# Patient Record
Sex: FEMALE | Race: ASIAN | NOT HISPANIC OR LATINO | Employment: FULL TIME | URBAN - METROPOLITAN AREA
[De-identification: names, ages, dates, MRNs, and addresses within clinical notes are randomized per-mention and may not be internally consistent; named-entity substitution may affect disease eponyms.]

---

## 2017-03-29 ENCOUNTER — APPOINTMENT (OUTPATIENT)
Dept: GENERAL RADIOLOGY | Facility: CLINIC | Age: 32
End: 2017-03-29
Attending: EMERGENCY MEDICINE
Payer: COMMERCIAL

## 2017-03-29 ENCOUNTER — HOSPITAL ENCOUNTER (EMERGENCY)
Facility: CLINIC | Age: 32
Discharge: HOME | End: 2017-03-30
Attending: EMERGENCY MEDICINE | Admitting: EMERGENCY MEDICINE
Payer: COMMERCIAL

## 2017-03-29 DIAGNOSIS — R73.9 HYPERGLYCEMIA: ICD-10-CM

## 2017-03-29 DIAGNOSIS — R07.89 ATYPICAL CHEST PAIN: ICD-10-CM

## 2017-03-29 PROCEDURE — 80048 BASIC METABOLIC PNL TOTAL CA: CPT | Performed by: EMERGENCY MEDICINE

## 2017-03-29 PROCEDURE — 25000125 ZZHC RX 250: Performed by: EMERGENCY MEDICINE

## 2017-03-29 PROCEDURE — 93005 ELECTROCARDIOGRAM TRACING: CPT

## 2017-03-29 PROCEDURE — 99285 EMERGENCY DEPT VISIT HI MDM: CPT

## 2017-03-29 PROCEDURE — 25000132 ZZH RX MED GY IP 250 OP 250 PS 637: Performed by: EMERGENCY MEDICINE

## 2017-03-29 PROCEDURE — 84484 ASSAY OF TROPONIN QUANT: CPT

## 2017-03-29 PROCEDURE — 71020 XR CHEST 2 VW: CPT

## 2017-03-29 RX ADMIN — LIDOCAINE HYDROCHLORIDE 30 ML: 20 SOLUTION ORAL; TOPICAL at 23:46

## 2017-03-29 NOTE — ED AVS SNAPSHOT
[facility] Emergency Department    [address]    [facility] [address]    Phone:  [phone]    Fax:  [phone]                                       Genesis Brennan   MRN: [id number]    Department:  [facility] Emergency Department   Date of Visit:  3/29/2017           After Visit Summary Signature Page     I have received my discharge instructions, and my questions have been answered. I have discussed any challenges I see with this plan with the nurse or doctor.    ..........................................................................................................................................  Patient/Patient Representative Signature      ..........................................................................................................................................  Patient Representative Print Name and Relationship to Patient    ..................................................               ................................................  Date                                            Time    ..........................................................................................................................................  Reviewed by Signature/Title    ...................................................              ..............................................  Date                                                            Time

## 2017-03-29 NOTE — ED AVS SNAPSHOT
[facility] Emergency Department    [address]    [facility] [address]    Phone:  [phone]    Fax:  [phone]                                       Genesis Brennan   MRN: [id number]    Department:  [facility] Emergency Department   Date of Visit:  3/29/2017           Patient Information     Date Of Birth          1985        Your diagnoses for this visit were:     Atypical chest pain     Hyperglycemia        You were seen by Jesus Slade MD.      Follow-up Information     Schedule an appointment as soon as possible for a visit with your Endocrinologist.        Discharge Instructions         High Blood Sugar (Hyperglycemia)  Too much glucose (sugar) in your blood is called hyperglycemia or high blood sugar. High blood sugar can lead to a dangerous condition called ketoacidosis. In severe cases, it can lead to coma.    Possible causes of hyperglycemia    Inadequate treatment plan for diabetes     Being sick    Being under stress    Taking certain medications, such as steroids    Eating too much food, especially carbohydrates    Being less active than usual    Not taking enough diabetes medication  Symptoms of hyperglycemia  Hyperglycemia may not cause symptoms. If you do have symptoms, they may include:    Thirst    Frequent need to urinate    Feeling tired    Nausea    Itchy, dry skin    Blurry vision    Fast breathing    Weakness    Dizziness    Wounds or skin infections that don t heal    Unexplained weight loss if hyperglycemia lasts for more than a few days   What you should do    Check your blood sugar.    Drink plenty of sugar-free, caffeine-free liquids such as water. Don t drink fruit juice.    Check your blood sugar again every 4 hours. If you take insulin or diabetes medications, follow your sick-day plan for taking medication. Call your healthcare provider if you are not able to eat.    Check your blood or urine for ketones as directed.    Call  your health care provider if your blood sugar and ketones do not return to your target range.  Preventing high blood sugar  To help keep your blood sugar from getting too high:    Control stress.    When you're ill, follow your sick-day plan.     Follow your meal plan. Eat only the amount of food on your meal plan    Follow your exercise plan.    Take your insulin or diabetes medications as directed by you health care team. Also test your blood sugar as directed. If the plan is not working for you, discuss it with your doctor.  Other things to do    Carry a medical ID card or wear a medical alert bracelet. It should say that you have diabetes. It should also say what to do in case you pass out or go into a coma.    Make sure family, friends, and coworkers know the signs of high blood sugar. Tell them what to do if your blood sugar gets very high and you can t help yourself.    Talk to your health care team about other things you can do to prevent high blood sugar.   Special note: Drink plenty of sugar-free and caffeine-free liquids when you feel symptoms of hyperglycemia. Call your doctor if you keep having episodes of hyperglycemia.        3099-0358 [employer]. [address] [place] [address]. All rights reserved. This information is not intended as a substitute for professional medical care. Always follow your healthcare professional's instructions.    Discharge Instructions  Chest Pain    You have been seen today for chest pain or discomfort.  At this time, your doctor has found no signs that your chest pain is due to a serious or life-threatening condition, (or you have declined more testing and/or admission to the hospital). However, sometimes there is a serious problem that does not show up right away. Your evaluation today may not be complete and you may need further testing and evaluation.     You need to follow-up with your regular doctor within 3 days.    Return to the Emergency  Department if:    Your chest pain changes, gets worse, starts to happen more often, or comes with less activity.    You are short of breath.    You get very weak or tired.    You pass out or faint.    You have any new symptoms, like fever, cough, numb legs, or you cough up blood.    You have anything else that worries you.    Until you follow-up with your regular doctor please do the following:    Take one aspirin daily unless you have an allergy or are told not to by your doctor.    If a stress test appointment has been made, go to the appointment.    If you have questions, contact your regular doctor.    If your doctor today has told you to follow-up with your regular doctor, it is very important that you make an appointment with your clinic and go to the appointment.  If you do not follow-up with your primary doctor, it may result in missing an important development which could result in permanent injury or disability and/or lasting pain.  If there is any problem keeping your appointment, call your doctor or return to the Emergency Department.    If you were given a prescription for medicine here today, be sure to read all of the information (including the package insert) that comes with your prescription.  This will include important information about the medicine, its side effects, and any warnings that you need to know about.  The pharmacist who fills the prescription can provide more information and answer questions you may have about the medicine.  If you have questions or concerns that the pharmacist cannot address, please call or return to the Emergency Department.         24 Hour Appointment Hotline       To make an appointment at any [facility], call [phone] ([phone]). If you don't have a family doctor or clinic, we will help you find one. [facility] are conveniently located to serve the needs of you and your family.             Review of your medicines      START taking         Dose / Directions Last dose taken    omeprazole 20 MG CR capsule   Commonly known as:  priLOSEC   Dose:  20 mg   Quantity:  30 capsule        Take 1 capsule (20 mg) by mouth daily   Refills:  0          CONTINUE these medicines which may have CHANGED, or have new prescriptions. If we are uncertain of the size of tablets/capsules you have at home, strength may be listed as something that might have changed.        Dose / Directions Last dose taken    ibuprofen 600 MG tablet   Commonly known as:  ADVIL/MOTRIN   Dose:  600 mg   What changed:  reasons to take this   Quantity:  20 tablet        Take 1 tablet (600 mg) by mouth every 6 hours as needed for moderate pain   Refills:  1          Our records show that you are taking the medicines listed below. If these are incorrect, please call your family doctor or clinic.        Dose / Directions Last dose taken    breast pump Misc   Dose:  1 each   Quantity:  1 each        1 each as needed   Refills:  0        cholecalciferol 5000 UNITS Tabs tablet   Commonly known as:  vitamin D3   Dose:  5000 Units   Quantity:  100 tablet        Take 1 tablet (5,000 Units) by mouth daily DUE FOR OFFICE VISIT   Refills:  0        order for DME   Quantity:  1 kit        Equipment being ordered:  Glucometer kit per insurance coverage. Also disp strips and lancets (50 each with prn refills. Check sugars daily   Refills:  1        prenatal multivitamin  plus iron 27-0.8 MG Tabs per tablet   Dose:  1 tablet        Take 1 tablet by mouth daily   Refills:  0                Prescriptions were sent or printed at these locations (2 Prescriptions)                   Other Prescriptions                Printed at Department/Unit printer (2 of 2)         omeprazole (PRILOSEC) 20 MG CR capsule               ibuprofen (ADVIL/MOTRIN) 600 MG tablet                Procedures and tests performed during your visit     Basic metabolic panel    EKG 12 lead    ISTAT troponin    Troponin POCT    XR Chest 2 Views       Orders Needing Specimen Collection     None      Pending Results     Date and Time Order Name Status Description    3/29/2017 2342 XR Chest 2 Views Preliminary     3/29/2017 2342 EKG 12 lead Preliminary             Pending Culture Results     No orders found for last 3 day(s).             Test Results from your hospital stay     3/30/2017 12:16 AM - Interface, Flexilab Results      Component Results     Component Value Ref Range & Units Status    Sodium 138 133 - 144 mmol/L Final    Potassium 3.4 3.4 - 5.3 mmol/L Final    Chloride 105 94 - 109 mmol/L Final    Carbon Dioxide 25 20 - 32 mmol/L Final    Anion Gap 8 3 - 14 mmol/L Final    Glucose 212 (H) 70 - 99 mg/dL Final    Urea Nitrogen 10 7 - 30 mg/dL Final    Creatinine 0.64 0.52 - 1.04 mg/dL Final    GFR Estimate >90  Non  GFR Calc   >60 mL/min/1.7m2 Final    GFR Estimate If Black >90   GFR Calc   >60 mL/min/1.7m2 Final    Calcium 8.3 (L) 8.5 - 10.1 mg/dL Final         3/30/2017 12:12 AM - Interface, Radiant Ib      Narrative     CHEST 2 VIEWS  3/30/2017 12:01 AM     HISTORY: Chest pain.    COMPARISON: None.    FINDINGS: The lungs are clear. Normal-sized cardiac silhouette.        Impression     IMPRESSION: No evidence of active cardiopulmonary disease.         3/30/2017 12:11 AM - Interface, Flexilab Results      Component Results     Component Value Ref Range & Units Status    Troponin I 0.00 0.00 - 0.10 ug/L Final                Clinical Quality Measure: Blood Pressure Screening     Your blood pressure was checked while you were in the emergency department today. The last reading we obtained was  BP: (!) 142/96 . Please read the guidelines below about what these numbers mean and what you should do about them.  If your systolic blood pressure (the top number) is less than 120 and your diastolic blood pressure (the bottom number) is less than 80, then your blood pressure is normal. There is nothing more that you need to do about  "it.  If your systolic blood pressure (the top number) is 120-139 or your diastolic blood pressure (the bottom number) is 80-89, your blood pressure may be higher than it should be. You should have your blood pressure rechecked within a year by a primary care provider.  If your systolic blood pressure (the top number) is 140 or greater or your diastolic blood pressure (the bottom number) is 90 or greater, you may have high blood pressure. High blood pressure is treatable, but if left untreated over time it can put you at risk for heart attack, stroke, or kidney failure. You should have your blood pressure rechecked by a primary care provider within the next 4 weeks.  If your provider in the emergency department today gave you specific instructions to follow-up with your doctor or provider even sooner than that, you should follow that instruction and not wait for up to 4 weeks for your follow-up visit.        Thank you for choosing [facility]       Thank you for choosing [facility] for your care. Our goal is always to provide you with excellent care. Hearing back from our patients is one way we can continue to improve our services. Please take a few minutes to complete the written survey that you may receive in the mail after you visit with us. Thank you!        [email or web address] Information     [employer] lets you send messages to your doctor, view your test results, renew your prescriptions, schedule appointments and more. To sign up, go to [email or web address] . Click on \"Log in\" on the left side of the screen, which will take you to the Welcome page. Then click on \"Sign up Now\" on the right side of the page.     You will be asked to enter the access code listed below, as well as some personal information. Please follow the directions to create your username and password.     Your access code is: [email or web address]  Expires: 2017 12:39 AM     Your access code will  in 90 days. If you need help or a new code, please call " your [facility] clinic or [phone].        Care EveryWhere ID     This is your Care EveryWhere ID. This could be used by other organizations to access your [facility] medical records  [id number]        After Visit Summary       This is your record. Keep this with you and show to your community pharmacist(s) and doctor(s) at your next visit.

## 2017-03-30 VITALS
DIASTOLIC BLOOD PRESSURE: 96 MMHG | BODY MASS INDEX: 28.19 KG/M2 | OXYGEN SATURATION: 98 % | HEART RATE: 75 BPM | WEIGHT: 180 LBS | RESPIRATION RATE: 18 BRPM | SYSTOLIC BLOOD PRESSURE: 142 MMHG | TEMPERATURE: 98 F

## 2017-03-30 LAB
ANION GAP SERPL CALCULATED.3IONS-SCNC: 8 MMOL/L (ref 3–14)
BUN SERPL-MCNC: 10 MG/DL (ref 7–30)
CALCIUM SERPL-MCNC: 8.3 MG/DL (ref 8.5–10.1)
CHLORIDE SERPL-SCNC: 105 MMOL/L (ref 94–109)
CO2 SERPL-SCNC: 25 MMOL/L (ref 20–32)
CREAT SERPL-MCNC: 0.64 MG/DL (ref 0.52–1.04)
GFR SERPL CREATININE-BSD FRML MDRD: ABNORMAL ML/MIN/1.7M2
GLUCOSE SERPL-MCNC: 212 MG/DL (ref 70–99)
INTERPRETATION ECG - MUSE: NORMAL
POTASSIUM SERPL-SCNC: 3.4 MMOL/L (ref 3.4–5.3)
SODIUM SERPL-SCNC: 138 MMOL/L (ref 133–144)
TROPONIN I BLD-MCNC: 0 UG/L (ref 0–0.1)

## 2017-03-30 RX ORDER — IBUPROFEN 600 MG/1
600 TABLET, FILM COATED ORAL EVERY 6 HOURS PRN
Qty: 20 TABLET | Refills: 1 | Status: SHIPPED | OUTPATIENT
Start: 2017-03-30 | End: 2022-07-07

## 2017-03-30 ASSESSMENT — PAIN DESCRIPTION - DESCRIPTORS: DESCRIPTORS: ACHING

## 2017-03-30 NOTE — ED PROVIDER NOTES
History     Chief Complaint:  Chest Wall Pain      HPI   Genesis Brennan is a 31 year old female who presents with chest wall pain.  She reports that she has constant pain and nothing makes it worse.  She has taken some Tylenol but her symptoms have not improved.  The patient states that she can do normal activities and that nothing makes the pain worse but it is always there.  When she was pregnant, she suffered from heartburn but does not feel like this is the same kind of feeling. The patient notes that she has been diagnosed with high cholesterol. She states that she has a history of gestational diabetes but did not follow up post- partum to obtain further diabetic evaluation.  The patient's brother has diabetes with kidney issues and CHF.  In addition, she reports that her mother had kidney failure.  She denies any leg swelling and has never been a smoker.  She also denies any trauma or physical exertion.      Allergies:  No Known Drug Allergies    Medications:    Prilosec     Past Medical History:    Hyperlipidemia  Psoriasis  Thyroid Disease  Type II diabetes    Past Surgical History:    Breast surgery, lump removed      Family History:    Mother - Hypertension, kidney failure  Brother - Diabetes, kidney problems, CHF    Social History:  The patient was unaccompanied to the ED.  Smoking Status: Negative  Alcohol Use: Negative  Marital Status:   [2]     Review of Systems   Cardiovascular: Positive for chest pain. Negative for leg swelling.   All other systems reviewed and are negative.      Physical Exam   Vitals:  Patient Vitals for the past 24 hrs:   BP Temp Temp src Pulse Heart Rate Resp SpO2 Weight   03/29/17 2353 - - - - - - 98 % -   03/29/17 2351 (!) 142/96 - - - - - 99 % -   03/29/17 2333 (!) 158/104 98  F (36.7  C) Temporal 75 75 18 100 % 81.6 kg (180 lb)     Physical Exam    Vital signs and nursing notes reviewed.     Constitutional: laying on gurney appears comfortable  HENT: Oropharynx is  clear and moist  Eyes: Conjunctivae are normal bilaterally. Pupils equal  Neck: normal range of motion  Cardiovascular: Normal rate, regular rhythm, normal heart sounds. No murmur or friction rub.  Equal distal pulses in all 4 extremities.  Pulmonary/Chest: Effort normal and breath sounds normal. No respiratory distress.   Abdominal: Soft. Bowel sounds are normal. No tenderness to palpation. No rebound or guarding.   Musculoskeletal: No joint swelling or edema. No calf pain or leg swelling.  Neurological: Alert and oriented. No focal weakness  Skin: Skin is warm and dry. No rash noted.   Psych: normal affect    Emergency Department Course     ECG:  ECG taken at 2350, ECG read at 2352  Normal sinus rhythm  Normal ECG  Rate 69 bpm. RI interval 182. QRS duration 74. QT/QTc 380/407. P-R-T axes 7  3  0    Imaging:  Radiology findings were communicated with the patient who voiced understanding of the findings.    Chest x-ray:  No evidence of active cardiopulmonary disease.  Reading per radiology    Laboratory:  Laboratory findings were communicated with the patient who voiced understanding of the findings.    BMP: Glucose: 212 (H), Calcium: 8.3 (L) (Creatinine: 0.64)  Troponin (Collected 2358): 0.00    Interventions:  2346 GI cocktail 30 mL PO     Emergency Department Course:  Nursing notes and vitals reviewed.  I performed an exam of the patient as documented above.   IV was inserted and blood was drawn for laboratory testing, results above.  The patient was sent for a Chest x-ray while in the emergency department, results above.   At 0058 the patient was rechecked and was updated on the results of her laboratory and imaging studies.   I discussed the treatment plan with the patient. She expressed understanding of this plan and consented to discharge. She will be discharged home with instructions for care and follow up. In addition, the patient will return to the emergency department if their symptoms persist, worsen, if  "new symptoms arise or if there is any concern.  All questions were answered.  I personally reviewed the laboratory and imaging results with the patient and answered all related questions prior to discharge.    Impression & Plan      Medical Decision Making:  Genesis Brennan is a 31 year old female who presents to the emergency department today for evaluation of anterior chest discomfort for the past three days. There is no definable exertional component to this and nothing makes it better or worse, \"It is just there.\" EKG was obtained which shows no evidence of pericarditis or ST segment elevation or depression. Chest x-ray shows no pneumothorax or concerning lung or heart findings and troponin was negative. BMP was obtained and she says she has a history of possible diabetes and this does show findings of an elevated blood sugar of 200, but otherwise electrolytes and kidney function are otherwise normal. I discussed with her about following up with her primary care physician or endocrinologist to discuss treatment for likely diabetes. There is no indication of acute cardiopulmonary cause of chest pain and I suspect it is likely musculoskeletal or GI related. She has no risk factors for PE and has no suggestion for DVT on exam and therefore I did not feel that this would need to be further pursued. She is safe for discharge home and understands reasons to return to the emergency department.     Diagnosis:    ICD-10-CM    1. Atypical chest pain R07.89    2. Hyperglycemia R73.9        Discharge Medications:  Discharge Medication List as of 3/30/2017 12:49 AM      START taking these medications    Details   omeprazole (PRILOSEC) 20 MG CR capsule Take 1 capsule (20 mg) by mouth daily, Disp-30 capsule, R-0, Local Print             Scribe Disclosure:  I, Tatianna Eng, am serving as a scribe at 11:37 PM on 3/29/2017 to document services personally performed by Jesus Slade MD, based on my observations and the " provider's statements to me.    3/29/2017   [facility] EMERGENCY DEPARTMENT       Jesus Slade MD  03/30/17 4105

## 2017-03-30 NOTE — DISCHARGE INSTRUCTIONS
High Blood Sugar (Hyperglycemia)  Too much glucose (sugar) in your blood is called hyperglycemia or high blood sugar. High blood sugar can lead to a dangerous condition called ketoacidosis. In severe cases, it can lead to coma.    Possible causes of hyperglycemia    Inadequate treatment plan for diabetes     Being sick    Being under stress    Taking certain medications, such as steroids    Eating too much food, especially carbohydrates    Being less active than usual    Not taking enough diabetes medication  Symptoms of hyperglycemia  Hyperglycemia may not cause symptoms. If you do have symptoms, they may include:    Thirst    Frequent need to urinate    Feeling tired    Nausea    Itchy, dry skin    Blurry vision    Fast breathing    Weakness    Dizziness    Wounds or skin infections that don t heal    Unexplained weight loss if hyperglycemia lasts for more than a few days   What you should do    Check your blood sugar.    Drink plenty of sugar-free, caffeine-free liquids such as water. Don t drink fruit juice.    Check your blood sugar again every 4 hours. If you take insulin or diabetes medications, follow your sick-day plan for taking medication. Call your healthcare provider if you are not able to eat.    Check your blood or urine for ketones as directed.    Call your health care provider if your blood sugar and ketones do not return to your target range.  Preventing high blood sugar  To help keep your blood sugar from getting too high:    Control stress.    When you're ill, follow your sick-day plan.     Follow your meal plan. Eat only the amount of food on your meal plan    Follow your exercise plan.    Take your insulin or diabetes medications as directed by you health care team. Also test your blood sugar as directed. If the plan is not working for you, discuss it with your doctor.  Other things to do    Carry a medical ID card or wear a medical alert bracelet. It should say that you have diabetes. It  should also say what to do in case you pass out or go into a coma.    Make sure family, friends, and coworkers know the signs of high blood sugar. Tell them what to do if your blood sugar gets very high and you can t help yourself.    Talk to your health care team about other things you can do to prevent high blood sugar.   Special note: Drink plenty of sugar-free and caffeine-free liquids when you feel symptoms of hyperglycemia. Call your doctor if you keep having episodes of hyperglycemia.        [id number] [employer]. [address] [place] [address], [place] [address]. All rights reserved. This information is not intended as a substitute for professional medical care. Always follow your healthcare professional's instructions.    Discharge Instructions  Chest Pain    You have been seen today for chest pain or discomfort.  At this time, your doctor has found no signs that your chest pain is due to a serious or life-threatening condition, (or you have declined more testing and/or admission to the hospital). However, sometimes there is a serious problem that does not show up right away. Your evaluation today may not be complete and you may need further testing and evaluation.     You need to follow-up with your regular doctor within 3 days.    Return to the Emergency Department if:    Your chest pain changes, gets worse, starts to happen more often, or comes with less activity.    You are short of breath.    You get very weak or tired.    You pass out or faint.    You have any new symptoms, like fever, cough, numb legs, or you cough up blood.    You have anything else that worries you.    Until you follow-up with your regular doctor please do the following:    Take one aspirin daily unless you have an allergy or are told not to by your doctor.    If a stress test appointment has been made, go to the appointment.    If you have questions, contact your regular doctor.    If your doctor today has told you to  follow-up with your regular doctor, it is very important that you make an appointment with your clinic and go to the appointment.  If you do not follow-up with your primary doctor, it may result in missing an important development which could result in permanent injury or disability and/or lasting pain.  If there is any problem keeping your appointment, call your doctor or return to the Emergency Department.    If you were given a prescription for medicine here today, be sure to read all of the information (including the package insert) that comes with your prescription.  This will include important information about the medicine, its side effects, and any warnings that you need to know about.  The pharmacist who fills the prescription can provide more information and answer questions you may have about the medicine.  If you have questions or concerns that the pharmacist cannot address, please call or return to the Emergency Department.

## 2017-03-30 NOTE — ED NOTES
Patient reports feeling better. Denies chest pain. Patient meets discharge criteria. Discussed AVS with patient. Questions answered. Patient verbalized understanding. Patient reports being ready to go home. Patient discharged home by car with all necessary information.

## 2022-03-08 ENCOUNTER — OFFICE VISIT (OUTPATIENT)
Dept: URGENT CARE | Facility: URGENT CARE | Age: 37
End: 2022-03-08
Payer: COMMERCIAL

## 2022-03-08 VITALS
HEART RATE: 84 BPM | SYSTOLIC BLOOD PRESSURE: 121 MMHG | RESPIRATION RATE: 13 BRPM | DIASTOLIC BLOOD PRESSURE: 73 MMHG | TEMPERATURE: 97.4 F | OXYGEN SATURATION: 97 %

## 2022-03-08 DIAGNOSIS — R11.0 NAUSEA: ICD-10-CM

## 2022-03-08 DIAGNOSIS — R19.7 DIARRHEA, UNSPECIFIED TYPE: ICD-10-CM

## 2022-03-08 DIAGNOSIS — R51.9 NONINTRACTABLE HEADACHE, UNSPECIFIED CHRONICITY PATTERN, UNSPECIFIED HEADACHE TYPE: ICD-10-CM

## 2022-03-08 DIAGNOSIS — R07.0 THROAT PAIN: Primary | ICD-10-CM

## 2022-03-08 LAB
DEPRECATED S PYO AG THROAT QL EIA: NEGATIVE
GROUP A STREP BY PCR: NOT DETECTED

## 2022-03-08 PROCEDURE — 87651 STREP A DNA AMP PROBE: CPT | Performed by: FAMILY MEDICINE

## 2022-03-08 PROCEDURE — 99203 OFFICE O/P NEW LOW 30 MIN: CPT | Performed by: FAMILY MEDICINE

## 2022-03-08 RX ORDER — APREMILAST 30 MG/1
30 TABLET, FILM COATED ORAL 2 TIMES DAILY
COMMUNITY
Start: 2022-02-16 | End: 2022-07-07

## 2022-03-08 RX ORDER — DULAGLUTIDE 1.5 MG/.5ML
1.5 INJECTION, SOLUTION SUBCUTANEOUS
Status: ON HOLD | COMMUNITY
Start: 2022-01-04 | End: 2023-12-13

## 2022-03-08 NOTE — LETTER
[facility]  [address] [address]  [phone]      March 8, 2022    RE:  Genesis Brennan                                                                                                                                                       [id number] [facility] [address]            To whom it may concern:    Genesis Brennan is under my professional care for Medical.  She  may return to work with the following: No working or lifting restrictions on or about tomorrow.          Sincerely,        Chetan Garnica DO    [facility]

## 2022-03-08 NOTE — PROGRESS NOTES
SUBJECTIVE: Genesis Brennan is a 36 year old female presenting with a chief complaint of nausea, diarrhea, cough, st and fatigue.  Onset of symptoms was 1 day(s) ago.  Course of illness is same.    Severity moderate  Current and Associated symptoms: none  Predisposing factors include None.    Past Medical History:   Diagnosis Date     Hyperlipidemia LDL goal < 100      Mantoux: positive 2012     Psoriasis     sees Derm     Thyroid disease     hyperthyroid     Type 2 diabetes mellitus without complication (goal A1C<7) 10/18/2015     Vitamin D deficiency      No Known Allergies  Social History     Tobacco Use     Smoking status: Never Smoker     Smokeless tobacco: Not on file   Substance Use Topics     Alcohol use: No       ROS:  SKIN: no rash  GI: no vomiting    OBJECTIVE:  /73   Pulse 84   Temp 97.4  F (36.3  C) (Oral)   Resp 13   SpO2 97% GENERAL APPEARANCE: healthy, alert and no distress  EYES: EOMI,  PERRL, conjunctiva clear  HENT: ear canals and TM's normal.  Nose and mouth without ulcers, erythema or lesions  RESP: lungs clear to auscultation - no rales, rhonchi or wheezes  ABDOMEN:  soft, nontender  SKIN: no suspicious lesions or rashes      ICD-10-CM    1. Throat pain  R07.0 Streptococcus A Rapid Screen w/Reflex to PCR     Group A Streptococcus PCR Throat Swab   2. Nausea  R11.0    3. Diarrhea, unspecified type  R19.7    4. Nonintractable headache, unspecified chronicity pattern, unspecified headache type  R51.9        Fluids/Rest, f/u if worse/not any better

## 2022-03-12 ENCOUNTER — HEALTH MAINTENANCE LETTER (OUTPATIENT)
Age: 37
End: 2022-03-12

## 2022-07-07 ENCOUNTER — OFFICE VISIT (OUTPATIENT)
Dept: URGENT CARE | Facility: URGENT CARE | Age: 37
End: 2022-07-07
Payer: COMMERCIAL

## 2022-07-07 VITALS
OXYGEN SATURATION: 98 % | HEART RATE: 83 BPM | RESPIRATION RATE: 20 BRPM | SYSTOLIC BLOOD PRESSURE: 118 MMHG | TEMPERATURE: 97.2 F | DIASTOLIC BLOOD PRESSURE: 81 MMHG

## 2022-07-07 DIAGNOSIS — A08.4 VIRAL GASTROENTERITIS: Primary | ICD-10-CM

## 2022-07-07 PROCEDURE — U0005 INFEC AGEN DETEC AMPLI PROBE: HCPCS | Performed by: PHYSICIAN ASSISTANT

## 2022-07-07 PROCEDURE — U0003 INFECTIOUS AGENT DETECTION BY NUCLEIC ACID (DNA OR RNA); SEVERE ACUTE RESPIRATORY SYNDROME CORONAVIRUS 2 (SARS-COV-2) (CORONAVIRUS DISEASE [COVID-19]), AMPLIFIED PROBE TECHNIQUE, MAKING USE OF HIGH THROUGHPUT TECHNOLOGIES AS DESCRIBED BY CMS-2020-01-R: HCPCS | Performed by: PHYSICIAN ASSISTANT

## 2022-07-07 PROCEDURE — 99213 OFFICE O/P EST LOW 20 MIN: CPT | Mod: CS | Performed by: PHYSICIAN ASSISTANT

## 2022-07-07 RX ORDER — ONDANSETRON 4 MG/1
4 TABLET, ORALLY DISINTEGRATING ORAL EVERY 8 HOURS PRN
Qty: 10 TABLET | Refills: 0 | Status: SHIPPED | OUTPATIENT
Start: 2022-07-07 | End: 2023-09-04

## 2022-07-07 ASSESSMENT — ENCOUNTER SYMPTOMS
SORE THROAT: 0
RHINORRHEA: 0
DIFFICULTY URINATING: 0
DYSURIA: 0
VOMITING: 1
COUGH: 0
FEVER: 0
NAUSEA: 1
DIARRHEA: 0

## 2022-07-07 NOTE — PROGRESS NOTES
Assessment & Plan:        ICD-10-CM    1. Viral gastroenteritis  A08.4 Symptomatic; Unknown COVID-19 Virus (Coronavirus) by PCR     ondansetron (ZOFRAN ODT) 4 MG ODT tab         Plan/Clinical Decision Making:    Patient having body aches with acute vomiting.   Normal exam and vitals.   Will obtain COVID testing.   Rest, clear fluids.   Can use Zofran for nausea as needed.   Note for work done.       Return if symptoms worsen or fail to improve in 5-7 days.     At the end of the encounter, I discussed results, diagnosis, medications. Discussed red flags for immediate return to clinic/ER, as well as indications for follow up if no improvement. Patient understood and agreed to plan. Patient was stable for discharge.        Tatianna Sandoval PA-C on 7/7/2022 at 1:15 PM          Subjective:     HPI:    Genesis is a 36 year old female who presents to clinic today for the following health issues:  Chief Complaint   Patient presents with     Urgent Care     Nausea     Nausea and vomiting and some body aches-Started on Tuesday      HPI    Body aches started Tuesday. Vomiting started yesterday, vomited 4 times.   Today vomited twice. Has had headaches.   Over 4th of July exposed to friend with stomach flu.   Able to drink water, no appetite.   No covid testing done.     History obtained from the patient.    Review of Systems   Constitutional: Negative for fever.   HENT: Negative for congestion, rhinorrhea and sore throat.    Respiratory: Negative for cough.    Gastrointestinal: Positive for nausea and vomiting. Negative for diarrhea.   Genitourinary: Negative for difficulty urinating and dysuria.         Patient Active Problem List   Diagnosis     Psoriasis     Hyperlipidemia with target LDL less than 100     Vitamin D deficiency     Encounter for triage in pregnant patient     Type 2 diabetes mellitus without complication (goal A1C<7)     Pregnancy with type 2 diabetes mellitus     Indication for care in labor or delivery      Supervision of normal pregnancy, third trimester        Past Medical History:   Diagnosis Date     Hyperlipidemia LDL goal < 100      Mantoux: positive 2012     Psoriasis     sees Derm     Thyroid disease     hyperthyroid     Type 2 diabetes mellitus without complication (goal A1C<7) 10/18/2015     Vitamin D deficiency        Social History     Tobacco Use     Smoking status: Never Smoker     Smokeless tobacco: Never Used   Substance Use Topics     Alcohol use: No             Objective:     Vitals:    07/07/22 1254   BP: 118/81   Pulse: 83   Resp: 20   Temp: 97.2  F (36.2  C)   TempSrc: Tympanic   SpO2: 98%         Physical Exam   EXAM:   Pleasant, alert, appropriate appearance. NAD.  Head Exam: Normocephalic, atraumatic.  Eye Exam:   non icteric/injection.    Ear Exam: TMs grey without bulging. Normal canals.  Normal pinna.  Nose Exam: Normal external nose.    OroPharynx Exam:  Moist mucous membranes. No erythema, pharynx without exudate or hypertrophy.  Neck/Thyroid Exam:  No LAD.  No nodules or enlargement.  Chest/Respiratory Exam: CTAB.  Cardiovascular Exam: RRR. No murmur or rubs.  ABD: soft, Non-tender, normal bowel sounds, no rebound/guarding.  No masses/organomegaly.      Results:  No results found for any visits on 07/07/22.

## 2022-07-07 NOTE — LETTER
[facility] [place]  [address]  [facility] [address]  Phone: [phone]  Fax: [phone]    July 7, 2022        Genesis Brennan  [address]  [facility] [address]          To whom it may concern:    RE: Genesis Brennan    Patient was seen and treated today at our clinic. Please excuse from work due to illness.   Can return to work on Monday.    Please contact me for questions or concerns.      Sincerely,        Tatianna Sandoval PA-C

## 2022-07-08 LAB — SARS-COV-2 RNA RESP QL NAA+PROBE: NEGATIVE

## 2022-10-24 ENCOUNTER — OFFICE VISIT (OUTPATIENT)
Dept: URGENT CARE | Facility: URGENT CARE | Age: 37
End: 2022-10-24
Payer: COMMERCIAL

## 2022-10-24 VITALS
OXYGEN SATURATION: 96 % | RESPIRATION RATE: 16 BRPM | TEMPERATURE: 98.9 F | BODY MASS INDEX: 28.19 KG/M2 | SYSTOLIC BLOOD PRESSURE: 118 MMHG | DIASTOLIC BLOOD PRESSURE: 66 MMHG | WEIGHT: 180 LBS | HEART RATE: 85 BPM

## 2022-10-24 DIAGNOSIS — R05.9 COUGH IN ADULT: ICD-10-CM

## 2022-10-24 DIAGNOSIS — Z20.822 SUSPECTED 2019 NOVEL CORONAVIRUS INFECTION: ICD-10-CM

## 2022-10-24 DIAGNOSIS — E11.9 TYPE 2 DIABETES MELLITUS WITHOUT COMPLICATION, WITHOUT LONG-TERM CURRENT USE OF INSULIN (H): ICD-10-CM

## 2022-10-24 DIAGNOSIS — Z20.822 EXPOSURE TO 2019 NOVEL CORONAVIRUS: Primary | ICD-10-CM

## 2022-10-24 PROCEDURE — U0005 INFEC AGEN DETEC AMPLI PROBE: HCPCS | Performed by: FAMILY MEDICINE

## 2022-10-24 PROCEDURE — U0003 INFECTIOUS AGENT DETECTION BY NUCLEIC ACID (DNA OR RNA); SEVERE ACUTE RESPIRATORY SYNDROME CORONAVIRUS 2 (SARS-COV-2) (CORONAVIRUS DISEASE [COVID-19]), AMPLIFIED PROBE TECHNIQUE, MAKING USE OF HIGH THROUGHPUT TECHNOLOGIES AS DESCRIBED BY CMS-2020-01-R: HCPCS | Performed by: FAMILY MEDICINE

## 2022-10-24 PROCEDURE — 99213 OFFICE O/P EST LOW 20 MIN: CPT | Mod: CS | Performed by: FAMILY MEDICINE

## 2022-10-24 RX ORDER — METFORMIN HYDROCHLORIDE 750 MG/1
TABLET, EXTENDED RELEASE ORAL
Status: ON HOLD | COMMUNITY
Start: 2022-09-11 | End: 2023-12-13

## 2022-10-24 RX ORDER — BENZONATATE 100 MG/1
100 CAPSULE ORAL 3 TIMES DAILY PRN
Qty: 30 CAPSULE | Refills: 0 | Status: SHIPPED | OUTPATIENT
Start: 2022-10-24 | End: 2023-09-04

## 2022-10-24 NOTE — PROGRESS NOTES
Chief Complaint   Patient presents with     Sick     Cough, ST, HA, bodyaches x yesterday -- took some Tylenol  -- HER  was POS for covid last week  -- only wants the covid       ASSESMENT AND PLAN   Genesis was seen today for sick.    Diagnoses and all orders for this visit:    Exposure to 2019 novel coronavirus    Cough in adult  -     benzonatate (TESSALON) 100 MG capsule; Take 1 capsule (100 mg) by mouth 3 times daily as needed    Suspected 2019 novel coronavirus infection    Type 2 diabetes mellitus without complication, without long-term current use of insulin (H)    Other orders  -     Asymptomatic COVID-19 Virus (Coronavirus) by PCR Nose          Tylenol, Fluids, Rest, Saline gargles, Saline nasal spray and Vaporizer  Did review with patient to do tighter control of blood sugar.  She declined flu testing.  Patient was given a note for work excusing her from work today and also to get back to work only if COVID-negative and fever free for 24 hours without fever reducers.    Initial differential diagnosis included but was not restricted to   Differential Diagnosis:  URI Adult/Peds:  Bronchitis-viral, Influenza, Viral pharyngitis, Viral syndrome and Viral upper respiratory illness  Medical Decision Making:  As chest is clear and no shortness of breath of wheezing symptoms no further testing is needed.  COVID test results are back tomorrow patient was advised to do tighter control of blood sugar and also watch her symptoms very closely     Routine discharge counseling was given to the patient and the patient understands that worsening, changing or persistent symptoms should prompt an immediate call or follow up with their primary physician or the emergency department. The importance of appropriate follow up was also discussed with the patient.     I have reviewed the nursing notes.  Review of the result(s) of each unique test -   X-Ray was not done.    Time  spent on the date of the encounter doing chart  review, patient visit and documentation   see orders in Epic  Pt verbalized and agreed with the plan and is aware of the worsening symptoms for which would need to follow up .  Pt was stable during time of discharge from the clinic     SUBJECTIVE     Genesis Brennan is a 36 year old female presenting with a chief complaint of    Chief Complaint   Patient presents with     Sick     Cough, ST, HA, bodyaches x yesterday -- took some Tylenol  -- HER  was POS for covid last week  -- only wants the covid           Genesis Brennan is a 36 year old female with history of diabetes, immunized against COVID presenting with a chief complaint of cough - non-productive, headache, body aches and fatigue. She is an established patient of [facility].  Onset of symptoms was 1 day(s) ago.  Course of illness is same.    Severity moderate  Current and Associated symptoms: cough - non-productive  Treatment measures tried include Tylenol/Ibuprofen.  Predisposing factors include ill contact: Family member .  Patient was diagnosed with COVID a week back.    Past Medical History:   Diagnosis Date     Hyperlipidemia LDL goal < 100      Mantoux: positive 2012     Psoriasis     sees Derm     Thyroid disease     hyperthyroid     Type 2 diabetes mellitus without complication (goal A1C<7) 10/18/2015     Vitamin D deficiency      Current Outpatient Medications   Medication Sig Dispense Refill     benzonatate (TESSALON) 100 MG capsule Take 1 capsule (100 mg) by mouth 3 times daily as needed 30 capsule 0     cholecalciferol (VITAMIN D3) 5000 UNITS TABS tablet Take 1 tablet (5,000 Units) by mouth daily DUE FOR OFFICE VISIT 100 tablet 0     ixekizumab (TALTZ) 80 MG/ML SOAJ auto-injector Inject 80 mg Subcutaneous every 28 days       metFORMIN (GLUCOPHAGE-XR) 750 MG 24 hr tablet TAKE 1 TABLET BY MOUTH DAILY AS DIRECTED       TRULICITY 1.5 MG/0.5ML pen Inject 1.5 mg Subcutaneous every 7 days        ondansetron (ZOFRAN ODT) 4 MG ODT tab Take 1 tablet  (4 mg) by mouth every 8 hours as needed for nausea (Patient not taking: Reported on 10/24/2022) 10 tablet 0     Social History     Tobacco Use     Smoking status: Never     Smokeless tobacco: Never   Substance Use Topics     Alcohol use: No     Family History   Problem Relation Age of Onset     Hypertension Mother         kidney failure         ROS:    10 point ROS of systems including Constitutional, Eyes, , Cardiovascular, Gastroenterology, Genitourinary, Integumentary, Muscularskeletal, Psychiatric ,neurological were all negative except for pertinent positives noted in my HPI         OBJECTIVE:    /66 (BP Location: Right arm, Patient Position: Chair, Cuff Size: Adult Regular)   Pulse 85   Temp 98.9  F (37.2  C) (Oral)   Resp 16   Wt 81.6 kg (180 lb)   LMP 10/19/2022   SpO2 96%   Breastfeeding No   BMI 28.19 kg/m    GENERAL APPEARANCE: healthy, alert and no distress  EYES: EOMI,  PERRL, conjunctiva clear  HENT: ear canals and TM's normal.  Nose and mouth without ulcers, erythema or lesions  NECK: supple, nontender, no lymphadenopathy  RESP: lungs clear to auscultation - no rales, rhonchi or wheezes  CV: regular rates and rhythm, normal S1 S2, no murmur noted  PSYCH: mentation appears normal  Physical Exam      (Note was completed, in part, with [employer] voice-recognition software. Documentation reviewed, but some grammatical, spelling, and word errors may remain.)  Yohana Tirado MD.

## 2022-10-24 NOTE — LETTER
[facility] [place]  [address]  [facility] [address]  Phone: [phone]  Fax: [phone]    10/24/22    Genesis Brennan  [address]  [facility] [address]      To whom it may concern:   Genesis Brennan was seen in the clinic today for current illness, she is being tested for covid , if negative can get back to work if fever free for 24 hrs without fever reducers .        Sincerely,      Yohana Tirado MD

## 2022-10-25 LAB — SARS-COV-2 RNA RESP QL NAA+PROBE: NEGATIVE

## 2022-12-26 ENCOUNTER — HEALTH MAINTENANCE LETTER (OUTPATIENT)
Age: 37
End: 2022-12-26

## 2023-04-16 ENCOUNTER — HEALTH MAINTENANCE LETTER (OUTPATIENT)
Age: 38
End: 2023-04-16

## 2023-05-16 ENCOUNTER — HOSPITAL ENCOUNTER (EMERGENCY)
Facility: CLINIC | Age: 38
Discharge: HOME | End: 2023-05-16
Attending: EMERGENCY MEDICINE | Admitting: EMERGENCY MEDICINE
Payer: COMMERCIAL

## 2023-05-16 VITALS
RESPIRATION RATE: 18 BRPM | TEMPERATURE: 99.5 F | HEART RATE: 90 BPM | DIASTOLIC BLOOD PRESSURE: 99 MMHG | OXYGEN SATURATION: 100 % | SYSTOLIC BLOOD PRESSURE: 144 MMHG

## 2023-05-16 DIAGNOSIS — R50.9 ACUTE FEBRILE ILLNESS: ICD-10-CM

## 2023-05-16 DIAGNOSIS — J06.9 ACUTE URI: ICD-10-CM

## 2023-05-16 LAB
ALBUMIN UR-MCNC: NEGATIVE MG/DL
ALBUMIN UR-MCNC: NEGATIVE MG/DL
APPEARANCE UR: CLEAR
APPEARANCE UR: CLEAR
BACTERIA #/AREA URNS HPF: ABNORMAL /HPF
BILIRUB UR QL STRIP: NEGATIVE
BILIRUB UR QL STRIP: NEGATIVE
COLOR UR AUTO: ABNORMAL
COLOR UR AUTO: ABNORMAL
FLUAV RNA SPEC QL NAA+PROBE: NEGATIVE
FLUBV RNA RESP QL NAA+PROBE: NEGATIVE
GLUCOSE UR STRIP-MCNC: 30 MG/DL
GLUCOSE UR STRIP-MCNC: NEGATIVE MG/DL
GROUP A STREP BY PCR: NOT DETECTED
HGB UR QL STRIP: ABNORMAL
HGB UR QL STRIP: NEGATIVE
KETONES UR STRIP-MCNC: 10 MG/DL
KETONES UR STRIP-MCNC: 20 MG/DL
LEUKOCYTE ESTERASE UR QL STRIP: ABNORMAL
LEUKOCYTE ESTERASE UR QL STRIP: NEGATIVE
MUCOUS THREADS #/AREA URNS LPF: PRESENT /LPF
NITRATE UR QL: NEGATIVE
NITRATE UR QL: NEGATIVE
PH UR STRIP: 5.5 [PH] (ref 5–7)
PH UR STRIP: 6 [PH] (ref 5–7)
RBC URINE: 0 /HPF
RBC URINE: 4 /HPF
RSV RNA SPEC NAA+PROBE: NEGATIVE
SARS-COV-2 RNA RESP QL NAA+PROBE: NEGATIVE
SP GR UR STRIP: 1 (ref 1–1.03)
SP GR UR STRIP: 1.01 (ref 1–1.03)
SQUAMOUS EPITHELIAL: 1 /HPF
SQUAMOUS EPITHELIAL: 6 /HPF
UROBILINOGEN UR STRIP-MCNC: NORMAL MG/DL
UROBILINOGEN UR STRIP-MCNC: NORMAL MG/DL
WBC URINE: 9 /HPF
WBC URINE: <1 /HPF

## 2023-05-16 PROCEDURE — 81001 URINALYSIS AUTO W/SCOPE: CPT | Mod: 91 | Performed by: EMERGENCY MEDICINE

## 2023-05-16 PROCEDURE — C9803 HOPD COVID-19 SPEC COLLECT: HCPCS

## 2023-05-16 PROCEDURE — 87088 URINE BACTERIA CULTURE: CPT | Performed by: EMERGENCY MEDICINE

## 2023-05-16 PROCEDURE — 99283 EMERGENCY DEPT VISIT LOW MDM: CPT

## 2023-05-16 PROCEDURE — 250N000013 HC RX MED GY IP 250 OP 250 PS 637: Performed by: EMERGENCY MEDICINE

## 2023-05-16 PROCEDURE — 87637 SARSCOV2&INF A&B&RSV AMP PRB: CPT | Performed by: EMERGENCY MEDICINE

## 2023-05-16 PROCEDURE — 87651 STREP A DNA AMP PROBE: CPT | Performed by: EMERGENCY MEDICINE

## 2023-05-16 RX ORDER — ACETAMINOPHEN 325 MG/1
975 TABLET ORAL ONCE
Status: COMPLETED | OUTPATIENT
Start: 2023-05-16 | End: 2023-05-16

## 2023-05-16 RX ADMIN — ACETAMINOPHEN 650 MG: 325 TABLET ORAL at 02:22

## 2023-05-16 ASSESSMENT — ENCOUNTER SYMPTOMS
DYSURIA: 0
MYALGIAS: 1
HEADACHES: 1
CHILLS: 1
SORE THROAT: 0
ABDOMINAL PAIN: 1
RHINORRHEA: 0
COUGH: 0
FEVER: 0

## 2023-05-16 ASSESSMENT — ACTIVITIES OF DAILY LIVING (ADL)
ADLS_ACUITY_SCORE: 35
ADLS_ACUITY_SCORE: 35

## 2023-05-16 NOTE — ED PROVIDER NOTES
History     Chief Complaint:  Flu Symptoms       HPI   Genesis Brennan is a 37 year old, 8 week pregnant female with a history of DM II and hyperlipidemia who presents with chills, myalgias, and left-sided abdominal pain that began today. Here, she has a minor headache as well. She denies sore throat, fever, cough, rhinorrhea, dysuria, vaginal bleeding, vaginal discharge,  or known sick contacts.    Independent Historian:   None - Patient Only    Review of External Notes: I reviewed her US from 5/12 that showed a viable intrauterine pregnancy at 7 weeks and 0 days and a fetal heart rate of 130. I reviewed an OB office visit on 5/2.    ROS:  Review of Systems   Constitutional: Positive for chills. Negative for fever.   HENT: Negative for rhinorrhea and sore throat.    Respiratory: Negative for cough.    Gastrointestinal: Positive for abdominal pain.   Genitourinary: Negative for dysuria, vaginal bleeding and vaginal discharge.   Musculoskeletal: Positive for myalgias.   Neurological: Positive for headaches.   All other systems reviewed and are negative.      Allergies:  No Known Allergies     Medications:    Tessalon  Taltz  Glucophage  Zofran  Trulicity    Past Medical History:    Hyperlipidemia  DM II  Thyroid disease    Past Surgical History:    Breast lumpectomy     Family History:    family history includes Hypertension in her mother.    Social History:   reports that she has never smoked. She has never used smokeless tobacco. She reports that she does not drink alcohol and does not use drugs.  PCP: No Ref-Primary, Physician     Physical Exam     Patient Vitals for the past 24 hrs:   BP Temp Temp src Pulse Resp SpO2   05/16/23 0052 (!) 144/99 99.5  F (37.5  C) Oral 90 18 100 %        Physical Exam  Constitutional:  Oriented to person, place, and time. Well-appearing.  HENT:   Head:    Normocephalic.   Mouth/Throat:   Oropharynx is clear and moist. Mild oral erythema. No exudate or uvular deviation.  Eyes:     EOM are normal. Pupils are equal, round, and reactive to light.   Neck:    Neck supple.   Cardiovascular:  Normal rate, regular rhythm and normal heart sounds.      Exam reveals no gallop and no friction rub.       No murmur heard.  Pulmonary/Chest:  Effort normal and breath sounds normal.      No respiratory distress. No wheezes. No rales.      No reproducible chest wall pain.  Abdominal:   Soft. No distension. No tenderness. No rebound and no guarding. No pain repercussion of flanks.  Musculoskeletal:  Normal range of motion.   Neurological:   Alert and oriented to person, place, and time.           Moves all 4 extremities spontaneously    Skin:    No rash noted. No pallor.     Emergency Department Course     Laboratory:  Labs Ordered and Resulted from Time of ED Arrival to Time of ED Departure   ROUTINE UA WITH MICROSCOPIC REFLEX TO CULTURE - Abnormal       Result Value    Color Urine Straw      Appearance Urine Clear      Glucose Urine 30 (*)     Bilirubin Urine Negative      Ketones Urine 20 (*)     Specific Gravity Urine 1.010      Blood Urine Negative      pH Urine 6.0      Protein Albumin Urine Negative      Urobilinogen Urine Normal      Nitrite Urine Negative      Leukocyte Esterase Urine Large (*)     Bacteria Urine Few (*)     Mucus Urine Present (*)     RBC Urine 4 (*)     WBC Urine 9 (*)     Squamous Epithelials Urine 6 (*)    ROUTINE UA WITH MICROSCOPIC REFLEX TO CULTURE - Abnormal    Color Urine Straw      Appearance Urine Clear      Glucose Urine Negative      Bilirubin Urine Negative      Ketones Urine 10 (*)     Specific Gravity Urine 1.004      Blood Urine Large (*)     pH Urine 5.5      Protein Albumin Urine Negative      Urobilinogen Urine Normal      Nitrite Urine Negative      Leukocyte Esterase Urine Negative      RBC Urine 0      WBC Urine <1      Squamous Epithelials Urine 1     INFLUENZA A/B, RSV, & SARS-COV2 PCR - Normal    Influenza A PCR Negative      Influenza B PCR Negative       RSV PCR Negative      SARS CoV2 PCR Negative     GROUP A STREPTOCOCCUS PCR THROAT SWAB - Normal    Group A strep by PCR Not Detected     URINE CULTURE        Emergency Department Course & Assessments:    Interventions:  Medications   acetaminophen (TYLENOL) tablet 975 mg (650 mg Oral $Given 23 0222)        Assessments:  0118 I obtained history and examined the patient, as noted above.  0315 I rechecked and updated the patient.    Independent Interpretation (X-rays, CTs, rhythm strip):  None    Consultations/Discussion of Management or Tests:  None     Social Determinants of Health affecting care:   None    Disposition:  The patient was discharged to home.     Impression & Plan      Medical Decision Makin-year-old female who is 8 weeks pregnant is here complaining of myalgias, headache, chills, and mild left lower abdominal pain.  Differential includes COVID, influenza, viral syndrome, strep pharyngitis, UTI, or other causes.  Strep and viral PCR is negative here.  Her initial UA was a dirty collection and was repeated with straight cath which is negative for pyuria.  She otherwise has a benign abdomen her pain was towards her left lower quadrant she has no reproducible tenderness in particular to the right lower quadrant.  She had previous ultrasound imaging that confirms her intrauterine pregnancy therefore no concern for ectopic pregnancy.  I would highly doubt surgical process and/or need for further imaging here.  She has no vaginal bleeding or discharge would doubt concerns for miscarriage.  I suspect likely early viral illness although remains be seen.  Headache is mild in sensation with no meningeal signs no photophobia no nausea vomiting no concern for meningitis.  She took his oral hydration fever control was told to follow-up with her primary care doctor return for any worsening symptoms or concerns.      ICD-10-CM    1. Acute febrile illness  R50.9       2. Acute URI  J06.9           Discharge  Medications:  Discharge Medication List as of 5/16/2023  3:16 AM           Scribe Disclosure:  I, Greg Blackburn, am serving as a scribe at 1:19 AM on 5/16/2023 to document services personally performed by Macario William MD based on my observations and the provider's statements to me.      Macario William MD  05/16/23 0357

## 2023-05-16 NOTE — DISCHARGE INSTRUCTIONS
Discharge Instructions  Upper Respiratory Infection    The upper respiratory tract includes the sinuses, nasal passages, pharynx, and larynx. A URI, or upper respiratory infection, is an infection of any of the parts of the upper airway. Symptoms include runny nose, congestion, sneezing, sore throat, cough, and fever. URIs are almost always caused by a virus. Antibiotics do not help with viral infections, so are generally not prescribed. A URI is very contagious through coughing and nasal secretions; make sure you wash your hands often and clean surfaces after sneezing, coughing or touching them. While you should start to improve in 3 - 5 days, remember that sometimes a cough can linger for several weeks.    Generally, every Emergency Department visit should have a follow-up clinic visit with either a primary or a specialty clinic/provider. Please follow-up as instructed by your emergency provider today.    Return to the Emergency Department if:  Any of your symptoms get much worse.  You seem very sick, like being too weak to get up.  You have chest pain or shortness of breath.   You have a severe headache.  You are vomiting (throwing up) so much you cannot keep fluids or medicines down.  You have confusion or seem unusually drowsy.  You have a seizure.    What can I do to help myself?  Fill any prescriptions the provider gave you and take them right away  If you have a fever, get plenty of rest and drink lots of fluids, especially water.  Using a humidifier or saline nose spray will also help loosen mucous.   Clothes or blankets will not change your fever. Do what is comfortable for you.  Bathing or sponging in lukewarm water may help you feel better.  Acetaminophen (Tylenol ) or ibuprofen (Advil , Motrin ) will help bring fever down and may help you feel more comfortable. Be sure to read and follow the package directions, and ask your provider if you have questions.  Do not drink alcohol.  Decongestants may help  you feel better. You may use decongestant nose sprays Afrin  (oxymetazoline) or Todd-Synephrine  (phenylephrine hydrochloride) for up to 3 days, or may use a decongestant tablet like Sudafed  (pseudoephedrine).  If you were given a prescription for medicine here today, be sure to read all of the information (including the package insert) that comes with your prescription.  This will include important information about the medicine, its side effects, and any warnings that you need to know about.  The pharmacist who fills the prescription can provide more information and answer questions you may have about the medicine.  If you have questions or concerns that the pharmacist cannot address, please call or return to the Emergency Department.   Remember that you can always come back to the Emergency Department if you are not able to see your regular provider in the amount of time listed above, if you get any new symptoms, or if there is anything that worries you.  Discharge Instructions  Fever    You have been seen today for a fever. Fever is a normal body reaction to illness or inflammation. Fever is a sign that your body is doing what it should to fight something off. Fever is not dangerous, but it can make you feel miserable, and you will probably feel better if you get your fever to go down. Most infections are caused by a virus, and antibiotics will not help; your provider will tell you whether antibiotics are needed in your case. At this time your provider does not find that your fever is a sign of anything dangerous or life-threatening.  However, sometimes the signs of serious illness do not show up right away so additional care may be necessary.    Generally, every Emergency Department visit should have a follow-up clinic visit with either a primary or a specialty clinic/provider. Please follow-up as instructed by your emergency provider today.    What can I do to help myself?  Fill any prescriptions the provider  gave you and take them right away--especially antibiotics.  If you have a fever, get plenty of rest and drink lots of fluids, especially water.  What clothes or blankets you have on will not change your fever. Do what is comfortable for you.  Bathing or sponging in lukewarm water may help you feel better.  Tylenol  (acetaminophen), Motrin  (ibuprofen), or Advil  (ibuprofen) help bring fever down and may help you feel more comfortable. Be sure to read and follow the package directions, and ask your provider if you have questions.  Do not drink alcohol.    Return to the Emergency Department if:  Any of the symptoms you have get much worse.  You seem very sick, like being too weak to get up.  You have any new symptoms, especially serious things like abdominal (belly) pain or chest pain.  You are short of breath.  You have a severe headache.  You are vomiting (throwing up) so much you cannot keep fluids or medicines down.  You have confusion or seem unusually drowsy.  You have a seizure.  You have anything else that worries you.  If you were given a prescription for medicine here today, be sure to read all of the information (including the package insert) that comes with your prescription.  This will include important information about the medicine, its side effects, and any warnings that you need to know about.  The pharmacist who fills the prescription can provide more information and answer questions you may have about the medicine.  If you have questions or concerns that the pharmacist cannot address, please call or return to the Emergency Department.   Remember that you can always come back to the Emergency Department if you are not able to see your regular provider in the amount of time listed above, if you get any new symptoms, or if there is anything that worries you.

## 2023-05-16 NOTE — LETTER
May 16, 2023      To Whom It May Concern:      Genesis Brennan was seen in our Emergency Department today, 05/16/23.  I expect her condition to improve over the next 2 days.  She may return to work/school when improved.    Sincerely,        Salvatore Obregon RN

## 2023-05-16 NOTE — ED TRIAGE NOTES
Arrives from home. States that she started feeling unwell today. Body ache, chills and abdominal pain. States throat was sore when she woke up this morning.      is 7-8 week pregnant.      took 1 tylenol today. Has not taken anything else.

## 2023-05-17 ENCOUNTER — TELEPHONE (OUTPATIENT)
Dept: EMERGENCY MEDICINE | Facility: CLINIC | Age: 38
End: 2023-05-17
Payer: MEDICAID

## 2023-05-17 LAB
BACTERIA UR CULT: ABNORMAL
BACTERIA UR CULT: ABNORMAL

## 2023-05-17 RX ORDER — CEPHALEXIN 500 MG/1
500 CAPSULE ORAL 4 TIMES DAILY
Qty: 12 CAPSULE | Refills: 0 | Status: SHIPPED | OUTPATIENT
Start: 2023-05-17 | End: 2023-05-20

## 2023-05-17 NOTE — TELEPHONE ENCOUNTER
[facility] Emergency Department/Urgent Care Lab result notification  [Note:  ED Lab Results RN will reference the [facility] Emergency Dept visit note prior to contacting patient AND/OR prior to consulting Emergency Dept Provider.  Highlights of Emergency Dept visit in information summary at the bottom of this telephone note]    1. Reason for call    Notify of lab results    Assess patient symptoms [if necessary]    Review ED Providers recommendations/discharge instructions (if necessary)    Advise per [facility] ED lab result protocol    2. Lab Result (including Rx patient on, if applicable).  If culture, copy of lab report at bottom.  Final urine culture on 5/17/23 shows the presence of bacteria(s): 10,000-50,000 CFU/mL Streptococcus agalactiae (Group B Streptococcus)   [facility] Emergency Dept discharge antibiotic: None  Recommendations in treatment per [facility] ED lab result Urine Culture protocol.    3. RN Assessment (Patient's current Symptoms):  Time of call: 10:53AM  Left voicemail message requesting a call back to [facility] ED Lab Result RN at [phone].  RN is available every day between 9 a.m. and 5:30 p.m.    Information summary from Emergency Dept/Urgent Care visit on 5/16/23  Symptoms reported at ED visit (Chief complaint, HPI) Flu Symptoms        HPI   Genesis Brennan is a 37 year old, 8 week pregnant female with a history of DM II and hyperlipidemia who presents with chills, myalgias, and left-sided abdominal pain that began today. Here, she has a minor headache as well. She denies sore throat, fever, cough, rhinorrhea, dysuria, vaginal bleeding, vaginal discharge,  or known sick contacts.     Independent Historian:   None - Patient Only     Review of External Notes: I reviewed her US from 5/12 that showed a viable intrauterine pregnancy at 7 weeks and 0 days and a fetal heart rate of 130. I reviewed an OB office visit on 5/2.   Significant Medical  hx, if applicable (i.e. CKD, diabetes) Reviewed   Allergies No Known Allergies   Weight, if applicable Wt Readings from Last 2 Encounters:   10/24/22 81.6 kg (180 lb)   03/29/17 81.6 kg (180 lb)      Coumadin/Warfarin [Yes /No] No   Creatinine Level (mg/dl) Creatinine   Date Value Ref Range Status   03/29/2017 0.64 0.52 - 1.04 mg/dL Final      Creatinine clearance (ml/min), if applicable Creatinine clearance cannot be calculated (Patient's most recent lab result is older than the maximum 30 days allowed.)   Pregnant (Yes/No/NA) YES   Breastfeeding (Yes/No/NA) na   ED providers Impression and Plan (applicable information) 37-year-old female who is 8 weeks pregnant is here complaining of myalgias, headache, chills, and mild left lower abdominal pain.  Differential includes COVID, influenza, viral syndrome, strep pharyngitis, UTI, or other causes.  Strep and viral PCR is negative here.  Her initial UA was a dirty collection and was repeated with straight cath which is negative for pyuria.  She otherwise has a benign abdomen her pain was towards her left lower quadrant she has no reproducible tenderness in particular to the right lower quadrant.  She had previous ultrasound imaging that confirms her intrauterine pregnancy therefore no concern for ectopic pregnancy.  I would highly doubt surgical process and/or need for further imaging here.  She has no vaginal bleeding or discharge would doubt concerns for miscarriage.  I suspect likely early viral illness although remains be seen.  Headache is mild in sensation with no meningeal signs no photophobia no nausea vomiting no concern for meningitis.  She took his oral hydration fever control was told to follow-up with her primary care doctor return for any worsening symptoms or concerns.   ED diagnosis 1. Acute febrile illness  R50.9         2. Acute URI           ED provider Macario William MD        Copy of Lab report (if applicable)  Urine Culture  Order:  [id number]   Collected 5/16/2023  2:18 AM      Status: Final result      Visible to patient: Yes (not seen)     Specimen Information: Urine, Midstream    1 Result Note  Culture 10,000-50,000 CFU/mL Streptococcus agalactiae (Group B Streptococcus) Abnormal       This organism is susceptible to ampicillin, penicillin, vancomycin and the cephalosporins. If treatment is required AND your patient is allergic to penicillin, contact the Microbiology Lab within 5 days to request susceptibility testing.  This organism may be significant in OB patients, however, it is part of the normal urogenital diogenes.   10,000-50,000 CFU/mL Mixture of urogenital diogenes            Resulting Agency: [facility]           Specimen Collected: 05/16/23  2:18 AM Last Resulted: 05/17/23  6:57 AM                 Dotty Xie RN  [facility]  Emergency Dept Lab Result RN  Ph# [phone]

## 2023-05-17 NOTE — TELEPHONE ENCOUNTER
[facility] Emergency Department/Urgent Care Lab result notification  [Note:  ED Lab Results RN will reference the [facility] Emergency Dept visit note prior to contacting patient AND/OR prior to consulting Emergency Dept Provider.  Highlights of Emergency Dept visit in information summary at the bottom of this telephone note]    1. Reason for call    Notify of lab results    Assess patient symptoms [if necessary]    Review ED Providers recommendations/discharge instructions (if necessary)    Advise per [facility] ED lab result protocol    2. Lab Result (including Rx patient on, if applicable).  If culture, copy of lab report at bottom.  Final urine culture on 5/17/23 shows the presence of bacteria(s): 10,000-50,000 CFU/mL Streptococcus agalactiae (Group B Streptococcus)   [facility] Emergency Dept discharge antibiotic: None  Recommendations in treatment per [facility] ED lab result Urine Culture protocol.    3. RN Assessment (Patient's current Symptoms):    Time of call: 2:40P    Assessment:  Having some vaginal itching but no uti sx's;  Today the flulike sx's have resolved;  Denies having any urinary frequency, urgency or burning with urination.     4. RN Recommendations/Instructions per [place] ED lab result protocol    [facility] ED lab result protocol used: Urine culture    Genesis was notified of lab result and treatment recommendations    Start or switch to Rx for Cephalexin (Keflex) 500 mg capsule, 1 capsule (500 mg) by mouth 4 times daily for 3 days. [facility] [place]      RN reviewed information about UTI's    She asked what would happen if she doesn't take the antibiotic.  If reluctant to take antibiotic, she was encouraged to discuss with her OB/GYN Provider.      5. Please Contact your PCP clinic or return to the Emergency department if your:    Symptoms worsen or other concerning symptoms.    Information summary from Emergency  Dept/Urgent Care visit on 5/16/23  Symptoms reported at ED visit (Chief complaint, HPI) Flu Symptoms      HPI   Genesis Brennan is a 37 year old, 8 week pregnant female with a history of DM II and hyperlipidemia who presents with chills, myalgias, and left-sided abdominal pain that began today. Here, she has a minor headache as well. She denies sore throat, fever, cough, rhinorrhea, dysuria, vaginal bleeding, vaginal discharge,  or known sick contacts.     Independent Historian:   None - Patient Only     Review of External Notes: I reviewed her US from 5/12 that showed a viable intrauterine pregnancy at 7 weeks and 0 days and a fetal heart rate of 130. I reviewed an OB office visit on 5/2.   Significant Medical hx, if applicable (i.e. CKD, diabetes) Reviewed   Allergies No Known Allergies   Weight, if applicable     Wt Readings from Last 2 Encounters:   10/24/22 81.6 kg (180 lb)   03/29/17 81.6 kg (180 lb)       Coumadin/Warfarin [Yes /No] No   Creatinine Level (mg/dl)       Creatinine   Date Value Ref Range Status   03/29/2017 0.64 0.52 - 1.04 mg/dL Final       Creatinine clearance (ml/min), if applicable Creatinine clearance cannot be calculated (Patient's most recent lab result is older than the maximum 30 days allowed.)   Pregnant (Yes/No/NA) YES   Breastfeeding (Yes/No/NA) na   ED providers Impression and Plan (applicable information) 37-year-old female who is 8 weeks pregnant is here complaining of myalgias, headache, chills, and mild left lower abdominal pain.  Differential includes COVID, influenza, viral syndrome, strep pharyngitis, UTI, or other causes.  Strep and viral PCR is negative here.  Her initial UA was a dirty collection and was repeated with straight cath which is negative for pyuria.  She otherwise has a benign abdomen her pain was towards her left lower quadrant she has no reproducible tenderness in particular to the right lower quadrant.  She had previous ultrasound imaging that confirms her  intrauterine pregnancy therefore no concern for ectopic pregnancy.  I would highly doubt surgical process and/or need for further imaging here.  She has no vaginal bleeding or discharge would doubt concerns for miscarriage.  I suspect likely early viral illness although remains be seen.  Headache is mild in sensation with no meningeal signs no photophobia no nausea vomiting no concern for meningitis.  She took his oral hydration fever control was told to follow-up with her primary care doctor return for any worsening symptoms or concerns.   ED diagnosis 1. Acute febrile illness  R50.9         2. Acute URI               ED provider Macario William MD Erich Halberg, RN  [facility]  Emergency Dept Lab Result RN  Ph# [phone]

## 2023-05-17 NOTE — RESULT ENCOUNTER NOTE
Final urine culture on 5/17/23 shows the presence of bacteria(s): 10,000-50,000 CFU/mL Streptococcus agalactiae (Group B Streptococcus)   [facility] Emergency Dept discharge antibiotic: None  Recommendations in treatment per [facility] ED lab result Urine Culture protocol.

## 2023-05-17 NOTE — RESULT ENCOUNTER NOTE
Genesis was notified of lab result and treatment recommendations  Start or switch to Rx for Cephalexin (Keflex) 500 mg capsule, 1 capsule (500 mg) by mouth 4 times daily for 3 days. sent to [Pharmacy - [place], [place]].

## 2023-05-17 NOTE — RESULT ENCOUNTER NOTE
Genesis was notified of lab result and treatment recommendations  Start or switch to Rx for Cephalexin (Keflex) 500 mg capsule, 1 capsule (500 mg) by mouth 4 times daily for 3 days. sent to [Pharmacy - [place], [place]].

## 2023-06-09 ENCOUNTER — HOSPITAL ENCOUNTER (EMERGENCY)
Facility: CLINIC | Age: 38
Discharge: HOME | End: 2023-06-10
Attending: EMERGENCY MEDICINE | Admitting: EMERGENCY MEDICINE
Payer: COMMERCIAL

## 2023-06-09 VITALS
TEMPERATURE: 98.1 F | RESPIRATION RATE: 16 BRPM | SYSTOLIC BLOOD PRESSURE: 123 MMHG | OXYGEN SATURATION: 100 % | DIASTOLIC BLOOD PRESSURE: 70 MMHG | HEART RATE: 83 BPM

## 2023-06-09 DIAGNOSIS — O26.891 ABDOMINAL PAIN IN PREGNANCY, FIRST TRIMESTER: ICD-10-CM

## 2023-06-09 DIAGNOSIS — R10.9 ABDOMINAL PAIN IN PREGNANCY, FIRST TRIMESTER: ICD-10-CM

## 2023-06-09 PROCEDURE — 99284 EMERGENCY DEPT VISIT MOD MDM: CPT | Mod: 25

## 2023-06-10 ENCOUNTER — ANCILLARY PROCEDURE (OUTPATIENT)
Dept: ULTRASOUND IMAGING | Facility: CLINIC | Age: 38
End: 2023-06-10
Attending: EMERGENCY MEDICINE
Payer: COMMERCIAL

## 2023-06-10 ASSESSMENT — ACTIVITIES OF DAILY LIVING (ADL): ADLS_ACUITY_SCORE: 35

## 2023-06-10 NOTE — ED PROVIDER NOTES
History     Chief Complaint:  Pregnancy Complications       The history is provided by the patient.      Genesis Brennan is a 37 year old female, approximately 12 weeks pregnant, B+, who presents to the ED due to vaginal leakage and mild cramping. She called her Ob/Gyn who recommended she come to the ED for further evaluation. While in the ED, she is no longer experiencing any fluid leaking but she is still having some pelvic cramping and low back pain. She describes the fluid as a mostly clear substance with a small amount of blood. Earlier today, she was seen in clinic and was told everything looked reassuring. She has her next appointment next week.       Independent Historian:   None - Patient Only    Review of External Notes:   I reviewed patient's note from 5/12. Her US demonstrated single, viable, intrauterine pregnancy.       Medications:    Ixebizumab   Metformin   Trulicity     Past Medical History:    Hyperlipidemia  Positive Mantoux test  Psoriasis   Thyroid disease  Type II diabetes  Vitamin D deficiency     Past Surgical History:    Breast lump removed     Physical Exam     Patient Vitals for the past 24 hrs:   BP Temp Temp src Pulse Resp SpO2   06/09/23 2137 123/70 98.1  F (36.7  C) Temporal 83 16 100 %        Physical Exam      CV: ppi, regular   Resp: speaking in full sentences without any resp distress  Abd: abdomen is soft without significant tenderness, masses, organomegaly or guarding  Ext: peripheral edema present:  No  Skin: warm dry well perfused  Neuro: Alert, no gross motor or sensory deficits,  gait stable        Emergency Department Course     Imaging:  POC US OB TRANSABDOMINAL LIMITED    (Results Pending)      Report per myself    Laboratory:  Labs Ordered and Resulted from Time of ED Arrival to Time of ED Departure - No data to display         Emergency Department Course & Assessments:             Interventions:  Medications - No data to display     Assessments:  2355 I evaluated the  patient and gained an initial history and exam.     Independent Interpretation (X-rays, CTs, rhythm strip):  I performed a bedside ultrasound. Results above.     Consultations/Discussion of Management or Tests:  I discussed with the patient the findings of the US and discussed her plan for Ob/Gyn follow-up.         Social Determinants of Health affecting care:   None    Disposition:  The patient was discharged to home.     Impression & Plan    CMS Diagnoses: None    Medical Decision Makin-year-old female 11 weeks 5 days pregnant who is experiencing lower abdominal/lower back discomfort and had a gush of fluid today it sounds to be blood-tinged.  She is known to be Rh+ for blood type.  No assisted fertility.  Abdomen benign vital signs unremarkable.  Denies ongoing vaginal bleeding or drainage.  No indication for RhoGAM.  Bedside ultrasound here showing a single living intrauterine pregnancy.  At this point given benign exam and vitals and no ongoing bleeding or fluid leakage will discharge home to follow-up with OB.    Critical Care time:  was 0 minutes for this patient excluding procedures.    Diagnosis:    ICD-10-CM    1. Abdominal pain in pregnancy, first trimester  O26.891     R10.9            Discharge Medications:  Discharge Medication List as of 6/10/2023 12:54 AM             Scribe Disclosure:  I, Rochelle Kerr, am serving as a scribe at 11:52 PM on 2023 to document services personally performed by Jake Alves MD based on my observations and the provider's statements to me.   2023   Jake Alves MD Walker, Jerome Richard, MD  06/10/23 0301

## 2023-06-10 NOTE — ED TRIAGE NOTES
"Patient states that she is 11-12 weeks pregnant and around 3 hours ago she felt a \"gush\" of fluid with some old blood in it.  Had some mild cramping at the time, called OB and was told to come in.  Is not feeling any more leaking or having any more cramps.  No other complaints     Triage Assessment     Row Name 06/09/23 5083       Triage Assessment (Adult)    Airway WDL WDL       Respiratory WDL    Respiratory WDL WDL       Skin Circulation/Temperature WDL    Skin Circulation/Temperature WDL WDL       Cardiac WDL    Cardiac WDL WDL       Peripheral/Neurovascular WDL    Peripheral Neurovascular WDL WDL       Cognitive/Neuro/Behavioral WDL    Cognitive/Neuro/Behavioral WDL WDL              "

## 2023-09-04 ENCOUNTER — HOSPITAL ENCOUNTER (EMERGENCY)
Facility: CLINIC | Age: 38
Discharge: HOME | End: 2023-09-04
Attending: EMERGENCY MEDICINE | Admitting: EMERGENCY MEDICINE
Payer: COMMERCIAL

## 2023-09-04 VITALS
RESPIRATION RATE: 16 BRPM | DIASTOLIC BLOOD PRESSURE: 63 MMHG | OXYGEN SATURATION: 98 % | HEART RATE: 89 BPM | TEMPERATURE: 98.3 F | SYSTOLIC BLOOD PRESSURE: 110 MMHG

## 2023-09-04 DIAGNOSIS — Z3A.28 28 WEEKS GESTATION OF PREGNANCY: ICD-10-CM

## 2023-09-04 DIAGNOSIS — K21.9 GASTROESOPHAGEAL REFLUX DISEASE, UNSPECIFIED WHETHER ESOPHAGITIS PRESENT: ICD-10-CM

## 2023-09-04 LAB
ANION GAP SERPL CALCULATED.3IONS-SCNC: 7 MMOL/L (ref 7–15)
BASOPHILS # BLD AUTO: 0 10E3/UL (ref 0–0.2)
BASOPHILS NFR BLD AUTO: 0 %
BUN SERPL-MCNC: 9.8 MG/DL (ref 6–20)
CALCIUM SERPL-MCNC: 9.9 MG/DL (ref 8.6–10)
CHLORIDE SERPL-SCNC: 104 MMOL/L (ref 98–107)
CREAT SERPL-MCNC: 0.54 MG/DL (ref 0.51–0.95)
DEPRECATED HCO3 PLAS-SCNC: 23 MMOL/L (ref 22–29)
EOSINOPHIL # BLD AUTO: 0.2 10E3/UL (ref 0–0.7)
EOSINOPHIL NFR BLD AUTO: 2 %
ERYTHROCYTE [DISTWIDTH] IN BLOOD BY AUTOMATED COUNT: 14.1 % (ref 10–15)
GFR SERPL CREATININE-BSD FRML MDRD: >90 ML/MIN/1.73M2
GLUCOSE BLDC GLUCOMTR-MCNC: 100 MG/DL (ref 70–99)
GLUCOSE SERPL-MCNC: 110 MG/DL (ref 70–99)
HCT VFR BLD AUTO: 29.1 % (ref 35–47)
HGB BLD-MCNC: 9.5 G/DL (ref 11.7–15.7)
HOLD SPECIMEN: NORMAL
HOLD SPECIMEN: NORMAL
IMM GRANULOCYTES # BLD: 0.1 10E3/UL
IMM GRANULOCYTES NFR BLD: 1 %
LYMPHOCYTES # BLD AUTO: 1.8 10E3/UL (ref 0.8–5.3)
LYMPHOCYTES NFR BLD AUTO: 18 %
MCH RBC QN AUTO: 26.2 PG (ref 26.5–33)
MCHC RBC AUTO-ENTMCNC: 32.6 G/DL (ref 31.5–36.5)
MCV RBC AUTO: 80 FL (ref 78–100)
MONOCYTES # BLD AUTO: 1.1 10E3/UL (ref 0–1.3)
MONOCYTES NFR BLD AUTO: 11 %
NEUTROPHILS # BLD AUTO: 6.8 10E3/UL (ref 1.6–8.3)
NEUTROPHILS NFR BLD AUTO: 68 %
NRBC # BLD AUTO: 0 10E3/UL
NRBC BLD AUTO-RTO: 0 /100
PLATELET # BLD AUTO: 319 10E3/UL (ref 150–450)
POTASSIUM SERPL-SCNC: 3.7 MMOL/L (ref 3.4–5.3)
RBC # BLD AUTO: 3.63 10E6/UL (ref 3.8–5.2)
SODIUM SERPL-SCNC: 134 MMOL/L (ref 136–145)
TROPONIN T SERPL HS-MCNC: 7 NG/L
WBC # BLD AUTO: 10 10E3/UL (ref 4–11)

## 2023-09-04 PROCEDURE — 99284 EMERGENCY DEPT VISIT MOD MDM: CPT

## 2023-09-04 PROCEDURE — 36415 COLL VENOUS BLD VENIPUNCTURE: CPT | Performed by: EMERGENCY MEDICINE

## 2023-09-04 PROCEDURE — 82962 GLUCOSE BLOOD TEST: CPT

## 2023-09-04 PROCEDURE — 84484 ASSAY OF TROPONIN QUANT: CPT | Performed by: EMERGENCY MEDICINE

## 2023-09-04 PROCEDURE — 82310 ASSAY OF CALCIUM: CPT | Performed by: EMERGENCY MEDICINE

## 2023-09-04 PROCEDURE — 85025 COMPLETE CBC W/AUTO DIFF WBC: CPT | Performed by: EMERGENCY MEDICINE

## 2023-09-04 PROCEDURE — 93005 ELECTROCARDIOGRAM TRACING: CPT

## 2023-09-04 ASSESSMENT — ACTIVITIES OF DAILY LIVING (ADL): ADLS_ACUITY_SCORE: 37

## 2023-09-05 LAB
ATRIAL RATE - MUSE: 81 BPM
DIASTOLIC BLOOD PRESSURE - MUSE: NORMAL MMHG
INTERPRETATION ECG - MUSE: NORMAL
P AXIS - MUSE: 44 DEGREES
PR INTERVAL - MUSE: 170 MS
QRS DURATION - MUSE: 72 MS
QT - MUSE: 356 MS
QTC - MUSE: 413 MS
R AXIS - MUSE: 46 DEGREES
SYSTOLIC BLOOD PRESSURE - MUSE: NORMAL MMHG
T AXIS - MUSE: 29 DEGREES
VENTRICULAR RATE- MUSE: 81 BPM

## 2023-09-05 NOTE — ED TRIAGE NOTES
Headaches yesterday. Today chest pain started about 4 hours prior to arrival. 6 months pregnant. Type II DM. No complications with pregnancy.

## 2023-09-05 NOTE — ED PROVIDER NOTES
History     Chief Complaint:  Chest Pain     HPI   Genesis Brennan is a 37 year old  female who is currently 6 months pregnant with a history of type II diabetes, hyperlipidemia, and hypothyroidism who presents for evaluation of chest pain. The patient reports onset of what she points to his epigastric pain today. States that the pain is burning and non radiating. Adds that she though it was heartburn, so she took Tums with some relief. Notes she has a cough. Denies shortness of breath, fever, vaginal bleeding, or uterine contractions. Fetal heart tones 165 bpm.    Independent Historian:   None - Patient Only    Review of External Notes:   I reviewed Care Everywhere and EPIC.      Medications:    Taltz   Metformin  Trulicity     Past Medical History:   Anemia  Hyperlipidemia  Hypothyroidism  Psoriasis  Type II diabetes  Vitamin D deficiency    Past Surgical History:    Breast lumpectomy, left      Physical Exam   Patient Vitals for the past 24 hrs:   BP Temp Temp src Pulse Resp SpO2   23 2303 -- -- -- 89 -- 98 %   23 2302 -- -- -- 93 -- 99 %   23 2301 -- -- -- 89 -- 98 %   23 2300 110/63 -- -- 89 -- 97 %   23 2247 -- -- -- 91 -- 100 %   23 2246 -- -- -- -- -- 99 %   23 2245 115/76 -- -- 94 -- 100 %   23 2232 139/74 98.3  F (36.8  C) Temporal 96 16 100 %      Physical Exam  Nursing note and vitals reviewed.  Constitutional: Cooperative.  Resting comfortably  Cardiovascular: Normal rate, regular rhythm and normal heart sounds.  No murmur.  Pulmonary/Chest: Effort normal and breath sounds normal. No respiratory distress. No wheezes. No rales.   Abdominal: Gravid abdomen. Soft. No distension. There is no tenderness. There is no rigidity and no guarding.   Neurological: Alert.  Oriented x3  Skin: Skin is warm and dry.   Psychiatric: Normal mood and affect.     Emergency Department Course   ECG  ECG taken at 2252, ECG read at 2202  Normal sinus rhythm    Rate 81 bpm.  WY interval 170 ms. QRS duration 72 ms. QT/QTc 356/4136 ms. P-R-T axes 44 46 29.     Laboratory:  Labs Ordered and Resulted from Time of ED Arrival to Time of ED Departure   BASIC METABOLIC PANEL - Abnormal       Result Value    Sodium 134 (*)     Potassium 3.7      Chloride 104      Carbon Dioxide (CO2) 23      Anion Gap 7      Urea Nitrogen 9.8      Creatinine 0.54      Calcium 9.9      Glucose 110 (*)     GFR Estimate >90     CBC WITH PLATELETS AND DIFFERENTIAL - Abnormal    WBC Count 10.0      RBC Count 3.63 (*)     Hemoglobin 9.5 (*)     Hematocrit 29.1 (*)     MCV 80      MCH 26.2 (*)     MCHC 32.6      RDW 14.1      Platelet Count 319      % Neutrophils 68      % Lymphocytes 18      % Monocytes 11      % Eosinophils 2      % Basophils 0      % Immature Granulocytes 1      NRBCs per 100 WBC 0      Absolute Neutrophils 6.8      Absolute Lymphocytes 1.8      Absolute Monocytes 1.1      Absolute Eosinophils 0.2      Absolute Basophils 0.0      Absolute Immature Granulocytes 0.1      Absolute NRBCs 0.0     GLUCOSE BY METER - Abnormal    GLUCOSE BY METER POCT 100 (*)    TROPONIN T, HIGH SENSITIVITY - Normal    Troponin T, High Sensitivity 7         Interventions:  Medications - No data to display     Assessments:  2306 I obtained history and examined the patient as noted above.     Independent Interpretation (X-rays, CTs, rhythm strip):  None    Consultations/Discussion of Management or Tests:  None      Social Determinants of Health affecting care:   None    Disposition:  The patient was discharged to home.     Impression & Plan    Medical Decision Making:  Genesis Brennan is a 37 year old female, currently 6 months pregnant, who presents for evaluation of chest pain.  On exam she points to her epigastrium and her findings are most consistent with reflux.  I considered a broad differential diagnosis for this patient including gastritis, GERD, cholecystitis, choledocholithiasis, biliary colic, pancreatitis, colonic  or small bowel pathology, vascular etiologies including aneurysm, ulcer.  Signs and symptoms here are consistent with GERD. No respiratory symptoms or concern for pulmonary embolism.  EKG and cardiac labs sent from triage were unremarkable.  Patient is comfortable with this discussion and will be discharged home.  No pregnancy related concerns.  Fetal heart tones normal    Diagnosis:    ICD-10-CM    1. Gastroesophageal reflux disease  K21.9       2. 6 Months gestation of pregnancy  Z3A.28          Scribe Disclosure:  I, Nan Bettencourt, am serving as a scribe at 11:02 PM on 9/4/2023 to document services personally performed by Lasha Briones MD based on my observations and the provider's statements to me.     9/4/2023   Lasha Briones MD Amdahl, John, MD  09/04/23 4010

## 2023-09-17 ENCOUNTER — HEALTH MAINTENANCE LETTER (OUTPATIENT)
Age: 38
End: 2023-09-17

## 2023-10-10 ENCOUNTER — LAB REQUISITION (OUTPATIENT)
Dept: LAB | Facility: CLINIC | Age: 38
End: 2023-10-10
Payer: COMMERCIAL

## 2023-10-10 DIAGNOSIS — Z3A.29 29 WEEKS GESTATION OF PREGNANCY: ICD-10-CM

## 2023-10-10 PROCEDURE — 86780 TREPONEMA PALLIDUM: CPT | Performed by: OBSTETRICS & GYNECOLOGY

## 2023-10-11 LAB — T PALLIDUM AB SER QL: NONREACTIVE

## 2023-11-27 ENCOUNTER — HOSPITAL ENCOUNTER (OUTPATIENT)
Facility: HOSPITAL | Age: 38
Discharge: HOME | End: 2023-11-28
Attending: OBSTETRICS & GYNECOLOGY | Admitting: OBSTETRICS & GYNECOLOGY
Payer: COMMERCIAL

## 2023-11-27 VITALS — SYSTOLIC BLOOD PRESSURE: 105 MMHG | RESPIRATION RATE: 17 BRPM | TEMPERATURE: 98.9 F | DIASTOLIC BLOOD PRESSURE: 57 MMHG

## 2023-11-27 LAB
ALBUMIN SERPL BCG-MCNC: 3.5 G/DL (ref 3.5–5.2)
ALP SERPL-CCNC: 81 U/L (ref 40–150)
ALT SERPL W P-5'-P-CCNC: 9 U/L (ref 0–50)
ANION GAP SERPL CALCULATED.3IONS-SCNC: 10 MMOL/L (ref 7–15)
AST SERPL W P-5'-P-CCNC: 12 U/L (ref 0–45)
BASOPHILS # BLD AUTO: 0 10E3/UL (ref 0–0.2)
BASOPHILS NFR BLD AUTO: 0 %
BILIRUB SERPL-MCNC: 0.3 MG/DL
BUN SERPL-MCNC: 9 MG/DL (ref 6–20)
CALCIUM SERPL-MCNC: 9.3 MG/DL (ref 8.6–10)
CHLORIDE SERPL-SCNC: 102 MMOL/L (ref 98–107)
CREAT SERPL-MCNC: 0.48 MG/DL (ref 0.51–0.95)
DEPRECATED HCO3 PLAS-SCNC: 22 MMOL/L (ref 22–29)
EGFRCR SERPLBLD CKD-EPI 2021: >90 ML/MIN/1.73M2
EOSINOPHIL # BLD AUTO: 0.1 10E3/UL (ref 0–0.7)
EOSINOPHIL NFR BLD AUTO: 1 %
ERYTHROCYTE [DISTWIDTH] IN BLOOD BY AUTOMATED COUNT: 13.6 % (ref 10–15)
GLUCOSE SERPL-MCNC: 110 MG/DL (ref 70–99)
HCT VFR BLD AUTO: 31.3 % (ref 35–47)
HGB BLD-MCNC: 10.4 G/DL (ref 11.7–15.7)
HOLD SPECIMEN: NORMAL
HOLD SPECIMEN: NORMAL
IMM GRANULOCYTES # BLD: 0.1 10E3/UL
IMM GRANULOCYTES NFR BLD: 1 %
LYMPHOCYTES # BLD AUTO: 1.3 10E3/UL (ref 0.8–5.3)
LYMPHOCYTES NFR BLD AUTO: 16 %
MCH RBC QN AUTO: 26.2 PG (ref 26.5–33)
MCHC RBC AUTO-ENTMCNC: 33.2 G/DL (ref 31.5–36.5)
MCV RBC AUTO: 79 FL (ref 78–100)
MONOCYTES # BLD AUTO: 0.7 10E3/UL (ref 0–1.3)
MONOCYTES NFR BLD AUTO: 9 %
NEUTROPHILS # BLD AUTO: 6.2 10E3/UL (ref 1.6–8.3)
NEUTROPHILS NFR BLD AUTO: 73 %
NRBC # BLD AUTO: 0 10E3/UL
NRBC BLD AUTO-RTO: 0 /100
PLATELET # BLD AUTO: 284 10E3/UL (ref 150–450)
POTASSIUM SERPL-SCNC: 3.4 MMOL/L (ref 3.4–5.3)
PROT SERPL-MCNC: 6.4 G/DL (ref 6.4–8.3)
RBC # BLD AUTO: 3.97 10E6/UL (ref 3.8–5.2)
SODIUM SERPL-SCNC: 134 MMOL/L (ref 135–145)
WBC # BLD AUTO: 8.4 10E3/UL (ref 4–11)

## 2023-11-27 PROCEDURE — 258N000003 HC RX IP 258 OP 636: Performed by: OBSTETRICS & GYNECOLOGY

## 2023-11-27 PROCEDURE — 36415 COLL VENOUS BLD VENIPUNCTURE: CPT | Performed by: OBSTETRICS & GYNECOLOGY

## 2023-11-27 PROCEDURE — 80053 COMPREHEN METABOLIC PANEL: CPT | Performed by: OBSTETRICS & GYNECOLOGY

## 2023-11-27 PROCEDURE — 85025 COMPLETE CBC W/AUTO DIFF WBC: CPT | Performed by: OBSTETRICS & GYNECOLOGY

## 2023-11-27 PROCEDURE — 250N000011 HC RX IP 250 OP 636: Performed by: OBSTETRICS & GYNECOLOGY

## 2023-11-27 PROCEDURE — 84156 ASSAY OF PROTEIN URINE: CPT | Performed by: OBSTETRICS & GYNECOLOGY

## 2023-11-27 RX ORDER — ASPIRIN 81 MG/1
81 TABLET, CHEWABLE ORAL DAILY
Status: ON HOLD | COMMUNITY
End: 2023-12-16

## 2023-11-27 RX ORDER — ONDANSETRON 4 MG/1
4 TABLET, FILM COATED ORAL ONCE
Status: COMPLETED | OUTPATIENT
Start: 2023-11-27 | End: 2023-11-27

## 2023-11-27 RX ORDER — LIDOCAINE 40 MG/G
CREAM TOPICAL
Status: DISCONTINUED | OUTPATIENT
Start: 2023-11-27 | End: 2023-11-28 | Stop reason: HOSPADM

## 2023-11-27 RX ADMIN — SODIUM CHLORIDE, POTASSIUM CHLORIDE, SODIUM LACTATE AND CALCIUM CHLORIDE 1000 ML: 600; 310; 30; 20 INJECTION, SOLUTION INTRAVENOUS at 22:25

## 2023-11-27 RX ADMIN — ONDANSETRON HYDROCHLORIDE 4 MG: 4 TABLET, FILM COATED ORAL at 22:25

## 2023-11-27 ASSESSMENT — ACTIVITIES OF DAILY LIVING (ADL): ADLS_ACUITY_SCORE: 27

## 2023-11-28 ENCOUNTER — HOSPITAL ENCOUNTER (OUTPATIENT)
Facility: HOSPITAL | Age: 38
End: 2023-11-28
Admitting: OBSTETRICS & GYNECOLOGY
Payer: COMMERCIAL

## 2023-11-28 LAB
ALBUMIN MFR UR ELPH: 13.7 MG/DL
CREAT UR-MCNC: 172.8 MG/DL
PROT/CREAT 24H UR: 0.08 MG/MG CR (ref 0–0.2)

## 2023-11-28 PROCEDURE — G0463 HOSPITAL OUTPT CLINIC VISIT: HCPCS

## 2023-11-28 ASSESSMENT — ACTIVITIES OF DAILY LIVING (ADL)
TOILETING_ISSUES: NO
DRESSING/BATHING_DIFFICULTY: NO
ADLS_ACUITY_SCORE: 22
CONCENTRATING,_REMEMBERING_OR_MAKING_DECISIONS_DIFFICULTY: NO

## 2023-11-28 NOTE — PROGRESS NOTES
Patient no longer feeling nausea and denies headache, VSS, epigastric pain, and SOB. Dr. Rodriguez updated on patient status and on EFM. Plan is to discharge, patient verbalizes agreement with plan.

## 2023-11-28 NOTE — PROGRESS NOTES
Data: Patient assessed in the [facility] for abdominal pain and nausea and vomiting. Cervical exam deferred. Membranes intact. Contractions are not present. See flowsheets for fetal assessment documentation.     Action: Presumed adequate fetal oxygenation documented. Discharge instructions reviewed. Patient instructed to report change in fetal movement, vaginal leaking of fluid or bleeding, abdominal pain, or any concerns related to the pregnancy to provider/clinic.      Response: Orders to discharge home per Dr. Rodriguez. Patient verbalized understanding of education and agreement with plan. Discharged to home at 0145.

## 2023-11-28 NOTE — PROVIDER NOTIFICATION
Data: Patient presented to [facility]: 2023  9:34 PM.  Reason for maternal/fetal assessment is abdominal pain and nausea and vomiting. Patient reports cramping throughout the day. Nausea and an inability to keep fluids down or eat hardly at all. Patient denies leaking of vaginal fluid/rupture of membranes, vaginal bleeding, vomiting, headache, visual disturbances, epigastric or RUQ pain, significant edema. Patient reports fetal movement is normal. Patient is a 36w0d .  Prenatal record reviewed. Pregnancy has been complicated by diabetes and advanced maternal age (>=34yo).    Vital signs wnl. Support person is not present.     Action: Verbal consent for EFM. Triage assessment completed.     Response: Patient verbalized agreement with plan. RN updating doctor Michael. Orders for labs, IV fluids and oral Zofran obtained.

## 2023-12-01 ENCOUNTER — LAB REQUISITION (OUTPATIENT)
Dept: LAB | Facility: CLINIC | Age: 38
End: 2023-12-01
Payer: COMMERCIAL

## 2023-12-01 DIAGNOSIS — Z34.80 ENCOUNTER FOR SUPERVISION OF OTHER NORMAL PREGNANCY, UNSPECIFIED TRIMESTER: ICD-10-CM

## 2023-12-01 DIAGNOSIS — E11.9 TYPE 2 DIABETES MELLITUS WITHOUT COMPLICATIONS (H): ICD-10-CM

## 2023-12-01 LAB
HBA1C MFR BLD: 6.6 %
HGB BLD-MCNC: 10.4 G/DL (ref 11.7–15.7)

## 2023-12-01 PROCEDURE — 85018 HEMOGLOBIN: CPT | Mod: ORL | Performed by: OBSTETRICS & GYNECOLOGY

## 2023-12-01 PROCEDURE — 83036 HEMOGLOBIN GLYCOSYLATED A1C: CPT | Mod: ORL | Performed by: OBSTETRICS & GYNECOLOGY

## 2023-12-13 ENCOUNTER — TRANSFERRED RECORDS (OUTPATIENT)
Dept: HEALTH INFORMATION MANAGEMENT | Facility: CLINIC | Age: 38
End: 2023-12-13

## 2023-12-13 ENCOUNTER — ANESTHESIA EVENT (OUTPATIENT)
Dept: OBGYN | Facility: HOSPITAL | Age: 38
End: 2023-12-13
Payer: COMMERCIAL

## 2023-12-13 ENCOUNTER — ANESTHESIA (OUTPATIENT)
Dept: OBGYN | Facility: HOSPITAL | Age: 38
End: 2023-12-13
Payer: COMMERCIAL

## 2023-12-13 PROBLEM — Z79.4 TYPE 2 DIABETES MELLITUS TREATED WITH INSULIN (H): Status: ACTIVE | Noted: 2023-12-13

## 2023-12-13 PROBLEM — E11.9 TYPE 2 DIABETES MELLITUS TREATED WITH INSULIN (H): Status: ACTIVE | Noted: 2023-12-13

## 2023-12-13 PROCEDURE — 370N000003 HC ANESTHESIA WARD SERVICE: Performed by: STUDENT IN AN ORGANIZED HEALTH CARE EDUCATION/TRAINING PROGRAM

## 2023-12-14 NOTE — ANESTHESIA POSTPROCEDURE EVALUATION
Patient: Genesis Brennan    Procedure: * No procedures listed *  Cervical Ripening    Anesthesia Type:  Epidural    Note:     Postop Pain Control: Uneventful            Sign Out: Well controlled pain   PONV: No   Neuro/Psych: Uneventful            Sign Out: Acceptable/Baseline neuro status   Airway/Respiratory: Uneventful            Sign Out: Acceptable/Baseline resp. status   CV/Hemodynamics: Uneventful            Sign Out: Acceptable CV status; No obvious hypovolemia; No obvious fluid overload   Other NRE: NONE   DID A NON-ROUTINE EVENT OCCUR? No       Last vitals:  Vitals:    12/14/23 0628 12/14/23 0633 12/14/23 0737   BP:   110/62   Pulse:      Resp:   16   Temp:   37.4  C (99.4  F)   SpO2: 100% 99% 97%       Electronically Signed By: Abraham Sandy MD  December 14, 2023  11:53 AM

## 2023-12-14 NOTE — ANESTHESIA PREPROCEDURE EVALUATION
Anesthesia Pre-Procedure Evaluation    Patient: Genesis Brennan   MRN: [id number] : 1985        Procedure :   Cervical Ripening       Past Medical History:   Diagnosis Date    Anemia     Hyperlipidemia     Hypothyroidism     Mantoux: positive 2012    Psoriasis     sees Derm    Type 2 diabetes mellitus 10/18/2015    Vitamin D deficiency       Past Surgical History:   Procedure Laterality Date    LUMPECTOMY BREAST Left       No Known Allergies   Social History     Tobacco Use    Smoking status: Never    Smokeless tobacco: Never   Substance Use Topics    Alcohol use: No      Wt Readings from Last 1 Encounters:   23 89.8 kg (198 lb)        Anesthesia Evaluation            ROS/MED HX  ENT/Pulmonary:  - neg pulmonary ROS     Neurologic:  - neg neurologic ROS     Cardiovascular:  - neg cardiovascular ROS     METS/Exercise Tolerance: >4 METS    Hematologic:  - neg hematologic  ROS     Musculoskeletal:  - neg musculoskeletal ROS     GI/Hepatic:  - neg GI/hepatic ROS     Renal/Genitourinary:  - neg Renal ROS     Endo:  - neg endo ROS   (+)  type II DM,                    Psychiatric/Substance Use:  - neg psychiatric ROS     Infectious Disease:  - neg infectious disease ROS     Malignancy:  - neg malignancy ROS     Other:  - neg other ROS    (+) Possibly pregnant (in labor), , ,         Physical Exam    Airway  airway exam normal           Respiratory Devices and Support         Dental       (+) Minor Abnormalities - some fillings, tiny chips      Cardiovascular   cardiovascular exam normal          Pulmonary                   OUTSIDE LABS:  CBC:   Lab Results   Component Value Date    WBC 8.4 2023    WBC 10.0 2023    HGB 10.6 (L) 2023    HGB 10.4 (L) 2023    HCT 31.3 (L) 2023    HCT 29.1 (L) 2023     2023     2023     BMP:   Lab Results   Component Value Date     (L) 2023     (L) 2023    POTASSIUM 3.4 2023     "POTASSIUM 3.7 09/04/2023    CHLORIDE 102 11/27/2023    CHLORIDE 104 09/04/2023    CO2 22 11/27/2023    CO2 23 09/04/2023    BUN 9.0 11/27/2023    BUN 9.8 09/04/2023    CR 0.48 (L) 11/27/2023    CR 0.54 09/04/2023    GLC 92 12/13/2023     (H) 12/13/2023     COAGS: No results found for: \"PTT\", \"INR\", \"FIBR\"  POC:   Lab Results   Component Value Date    BGM 73 02/04/2016    HCG Negative 04/09/2009     HEPATIC:   Lab Results   Component Value Date    ALBUMIN 3.5 11/27/2023    PROTTOTAL 6.4 11/27/2023    ALT 9 11/27/2023    AST 12 11/27/2023    ALKPHOS 81 11/27/2023    BILITOTAL 0.3 11/27/2023     OTHER:   Lab Results   Component Value Date    A1C 6.6 (H) 12/01/2023    MARILU 9.3 11/27/2023    TSH <0.01 (L) 09/04/2015    T4 1.13 09/04/2015    T4 13.3 09/04/2015       Anesthesia Plan    ASA Status:  2       Anesthesia Type: Epidural.              Consents    Anesthesia Plan(s) and associated risks, benefits, and realistic alternatives discussed. Questions answered and patient/representative(s) expressed understanding.     - Discussed:     - Discussed with:  Patient            Postoperative Care            Comments:    Other Comments: Patient requests labor epidural. Chart reviewed, including labs. Patient interviewed and examined at bedside with RN present throughout. Discussed the procedure of epidural placement, expectations, and risks including but not limited to: bleeding, infection, damage to tissues under the skin (nerves, muscles, blood vessels), hypotension, headache, and epidural failure. All questions were answered.             Quinn Blackmon MD    I have reviewed the pertinent notes and labs in the chart from the past 30 days and (re)examined the patient.  Any updates or changes from those notes are reflected in this note.     # Hyponatremia: Lowest Na = 134 mmol/L in last 30 days, will monitor as appropriate         # Drug Induced Platelet Defect: home medication list includes an antiplatelet " medication  # DMII: A1C = 6.6 % (Ref range: <5.7 %) within past 6 months

## 2023-12-14 NOTE — ANESTHESIA PROCEDURE NOTES
Epidural catheter Procedure Note    Pre-Procedure   Staff -        Anesthesiologist:  Quinn Blackmon MD       Performed By: anesthesiologist       Location: OB       Procedure Start/Stop Times: 12/13/2023 9:32 PM and 12/13/2023 9:50 PM       Pre-Anesthestic Checklist: patient identified, IV checked, risks and benefits discussed, informed consent, monitors and equipment checked, pre-op evaluation, at physician/surgeon's request and post-op pain management  Timeout:       Correct Patient: Yes        Correct Procedure: Yes        Correct Site: Yes        Correct Position: Yes   Procedure Documentation  Procedure: epidural catheter       Patient Position: sitting       Patient Prep/Sterile Barriers: sterile gloves, mask, patient draped       Skin prep: Chloraprep       Local skin infiltrated with mL of 2% lidocaine.        Insertion Site: L4-5.       Technique: LORT saline        EMMANUEL at 4 cm.       Needle Type: O3[employer] needle       Needle Gauge: 17.        Needle Length (Inches): 3.5        Catheter: 18 G.          Catheter threaded easily.           Threaded 9 cm at skin.         # of attempts: 1 and  # of redirects:  1    Assessment/Narrative         Paresthesias: No.       Test dose of 3 mL lidocaine 1.5% w/ 1:200,000 epinephrine at 21:45 CST.         Test dose negative, 3 minutes after injection, for signs of intravascular, subdural, or intrathecal injection.       Insertion/Infusion Method: LORT saline       Aspiration negative for Heme or CSF via Epidural Catheter.    Medication(s) Administered   Medication Administration Time: 12/13/2023 9:32 PM     Comments:  After discussion of risks and benefits of a labor epidural including risk of bleeding, hypotension, failed epidural, infection, and headache. The patient agreed to placement of an epidural. Hands washed. The skin over the lower back was prepped and draped using sterile technique with sterile prep, drape, gloves, and hat. Sterile prep was allowed to dry  "for 3 minutes. Lidocaine was used subcutaneously to provide analgesia during placement. Loss of resistance technique was used with saline. The epidural catheter was threaded easily. Negative aspiration of heme/CSF from epidural catheter. Negative test dose of Lidocaine 1.5% with epi. Epidural catheter secured using sterile tegaderm and tape. L&D RN was present the entire procedure.         FOR [facility] ([facility]) ONLY:   Pain Team Contact information: please page the Pain Team Via [employer]. Search \"Pain\". During daytime hours, please page the attending first. At night please page the resident first.      "

## 2023-12-19 ENCOUNTER — PATIENT OUTREACH (OUTPATIENT)
Dept: CARE COORDINATION | Facility: CLINIC | Age: 38
End: 2023-12-19
Payer: COMMERCIAL

## 2023-12-19 NOTE — PROGRESS NOTES
Clinic Care Coordination Contact  [facility]/Voicemail    Clinical Data: Care Coordinator Outreach    Outreach Documentation Number of Outreach Attempt   12/19/2023   8:39 AM 2       Left message on patient's voicemail with call back information and requested return call.  Care Coordinator will do no further outreaches at this time.    Marcy Aviles  [phone]  Care

## 2023-12-26 ENCOUNTER — OFFICE VISIT (OUTPATIENT)
Dept: URGENT CARE | Facility: URGENT CARE | Age: 38
End: 2023-12-26
Payer: COMMERCIAL

## 2023-12-26 VITALS
TEMPERATURE: 100.4 F | HEART RATE: 94 BPM | DIASTOLIC BLOOD PRESSURE: 81 MMHG | OXYGEN SATURATION: 97 % | RESPIRATION RATE: 20 BRPM | SYSTOLIC BLOOD PRESSURE: 121 MMHG | BODY MASS INDEX: 27.67 KG/M2 | WEIGHT: 182 LBS

## 2023-12-26 DIAGNOSIS — N61.0 MASTITIS, RIGHT, ACUTE: Primary | ICD-10-CM

## 2023-12-26 PROCEDURE — 99214 OFFICE O/P EST MOD 30 MIN: CPT | Performed by: NURSE PRACTITIONER

## 2023-12-26 RX ORDER — CEPHALEXIN 500 MG/1
500 CAPSULE ORAL 4 TIMES DAILY
Qty: 40 CAPSULE | Refills: 0 | Status: SHIPPED | OUTPATIENT
Start: 2023-12-26 | End: 2024-01-05

## 2023-12-26 ASSESSMENT — ENCOUNTER SYMPTOMS
COUGH: 0
MYALGIAS: 1
FATIGUE: 1

## 2023-12-27 NOTE — PATIENT INSTRUCTIONS
Please read attached mastitis handout in detail.    Use warm packs to your breast before breast-feeding.  Follow specific instructions on the handout.    Cephalexin for infection until completed.  You should be feeling much better and 2 to 3 days.    If you are not feeling better by Friday -no fever and pain gone, I would contact your OB/GYN to further discuss or come back if needed.  If you are getting worse, come back as soon as possible.

## 2023-12-27 NOTE — PROGRESS NOTES
Assessment & Plan     Mastitis, right, acute    - cephALEXin (KEFLEX) 500 MG capsule  Dispense: 40 capsule; Refill: 0     Fever, body aches associated with right breast pain.  Has been bottlefeeding only.  Preceded by engorgement.  Some visible erythema especially over the right side of the nipple border with tenderness extending into the nipple area.  Indurated.    No coughs or other symptoms that would explain the fever.    Warm packs, Tylenol, ibuprofen.  Does not have access to a breast pump.           Return in about 3 days (around 12/29/2023) for If no better.    Jennifer Borja, [facility] ADRIANNA Hurtado is a 37 year old female who presents to clinic today for the following health issues:  Chief Complaint   Patient presents with    Urgent Care     Mastitis since Friday on the right breast, she just had a baby      HPI    Patient had baby at 38 weeks 3 days on December 13.    Is bottlefeeding only.  Did have some engorgement in bilateral breasts.  Left breast doing fine.  However has still been leaking a little milk out of the right side with pain starting in the last couple of days and fever today.    History of type 2 diabetes preceding pregnancy.    Has had mastitis with previous pregnancies.      Review of Systems   Constitutional:  Positive for fatigue.   Respiratory:  Negative for cough.    Musculoskeletal:  Positive for myalgias.               Objective    /81   Pulse 94   Temp 100.4  F (38  C)   Resp 20   Wt 82.6 kg (182 lb)   LMP 03/20/2023 (Exact Date)   SpO2 97%   BMI 27.67 kg/m    Physical Exam  Chest:          Comments: General area of induration, tenderness.  Erythema noted along the medial edge of the nipple line.

## 2024-03-26 PROCEDURE — 99284 EMERGENCY DEPT VISIT MOD MDM: CPT

## 2024-03-27 ENCOUNTER — HOSPITAL ENCOUNTER (EMERGENCY)
Facility: CLINIC | Age: 39
Discharge: HOME | End: 2024-03-27
Attending: EMERGENCY MEDICINE | Admitting: EMERGENCY MEDICINE
Payer: COMMERCIAL

## 2024-03-27 VITALS
HEART RATE: 88 BPM | TEMPERATURE: 98.1 F | BODY MASS INDEX: 27.28 KG/M2 | DIASTOLIC BLOOD PRESSURE: 86 MMHG | WEIGHT: 180 LBS | SYSTOLIC BLOOD PRESSURE: 131 MMHG | HEIGHT: 68 IN | RESPIRATION RATE: 20 BRPM | OXYGEN SATURATION: 99 %

## 2024-03-27 DIAGNOSIS — E86.0 MILD DEHYDRATION: ICD-10-CM

## 2024-03-27 DIAGNOSIS — J02.0 ACUTE STREPTOCOCCAL PHARYNGITIS: Primary | ICD-10-CM

## 2024-03-27 DIAGNOSIS — R79.89 ELEVATED LACTIC ACID LEVEL: ICD-10-CM

## 2024-03-27 LAB
ALBUMIN UR-MCNC: NEGATIVE MG/DL
ANION GAP SERPL CALCULATED.3IONS-SCNC: 14 MMOL/L (ref 7–15)
APPEARANCE UR: CLEAR
B-OH-BUTYR SERPL-SCNC: <0.18 MMOL/L
BASOPHILS # BLD AUTO: 0 10E3/UL (ref 0–0.2)
BASOPHILS NFR BLD AUTO: 0 %
BILIRUB UR QL STRIP: NEGATIVE
BUN SERPL-MCNC: 9.3 MG/DL (ref 6–20)
CALCIUM SERPL-MCNC: 9.5 MG/DL (ref 8.6–10)
CHLORIDE SERPL-SCNC: 99 MMOL/L (ref 98–107)
COLOR UR AUTO: ABNORMAL
CREAT SERPL-MCNC: 0.63 MG/DL (ref 0.51–0.95)
DEPRECATED HCO3 PLAS-SCNC: 22 MMOL/L (ref 22–29)
EGFRCR SERPLBLD CKD-EPI 2021: >90 ML/MIN/1.73M2
EOSINOPHIL # BLD AUTO: 0 10E3/UL (ref 0–0.7)
EOSINOPHIL NFR BLD AUTO: 0 %
ERYTHROCYTE [DISTWIDTH] IN BLOOD BY AUTOMATED COUNT: 12.8 % (ref 10–15)
FLUAV RNA SPEC QL NAA+PROBE: NEGATIVE
FLUBV RNA RESP QL NAA+PROBE: NEGATIVE
GLUCOSE BLDC GLUCOMTR-MCNC: 171 MG/DL (ref 70–99)
GLUCOSE SERPL-MCNC: 200 MG/DL (ref 70–99)
GLUCOSE UR STRIP-MCNC: NEGATIVE MG/DL
GROUP A STREP BY PCR: DETECTED
HCO3 BLDV-SCNC: 21 MMOL/L (ref 21–28)
HCO3 BLDV-SCNC: 22 MMOL/L (ref 21–28)
HCO3 BLDV-SCNC: 25 MMOL/L (ref 21–28)
HCT VFR BLD AUTO: 37.3 % (ref 35–47)
HGB BLD-MCNC: 12.3 G/DL (ref 11.7–15.7)
HGB UR QL STRIP: NEGATIVE
IMM GRANULOCYTES # BLD: 0.1 10E3/UL
IMM GRANULOCYTES NFR BLD: 1 %
KETONES UR STRIP-MCNC: NEGATIVE MG/DL
LACTATE BLD-SCNC: 2.1 MMOL/L
LACTATE BLD-SCNC: 2.2 MMOL/L
LACTATE BLD-SCNC: 3.1 MMOL/L
LACTATE SERPL-SCNC: 2.7 MMOL/L (ref 0.7–2)
LEUKOCYTE ESTERASE UR QL STRIP: ABNORMAL
LYMPHOCYTES # BLD AUTO: 0.8 10E3/UL (ref 0.8–5.3)
LYMPHOCYTES NFR BLD AUTO: 6 %
MCH RBC QN AUTO: 25.6 PG (ref 26.5–33)
MCHC RBC AUTO-ENTMCNC: 33 G/DL (ref 31.5–36.5)
MCV RBC AUTO: 78 FL (ref 78–100)
MONOCYTES # BLD AUTO: 0.8 10E3/UL (ref 0–1.3)
MONOCYTES NFR BLD AUTO: 6 %
NEUTROPHILS # BLD AUTO: 11.5 10E3/UL (ref 1.6–8.3)
NEUTROPHILS NFR BLD AUTO: 87 %
NITRATE UR QL: NEGATIVE
NRBC # BLD AUTO: 0 10E3/UL
NRBC BLD AUTO-RTO: 0 /100
PCO2 BLDV: 35 MM HG (ref 40–50)
PCO2 BLDV: 40 MM HG (ref 40–50)
PCO2 BLDV: 43 MM HG (ref 40–50)
PH BLDV: 7.33 [PH] (ref 7.32–7.43)
PH BLDV: 7.37 [PH] (ref 7.32–7.43)
PH BLDV: 7.4 [PH] (ref 7.32–7.43)
PH UR STRIP: 5.5 [PH] (ref 5–7)
PLATELET # BLD AUTO: 299 10E3/UL (ref 150–450)
PO2 BLDV: 27 MM HG (ref 25–47)
PO2 BLDV: 36 MM HG (ref 25–47)
PO2 BLDV: 41 MM HG (ref 25–47)
POTASSIUM SERPL-SCNC: 4.1 MMOL/L (ref 3.4–5.3)
RBC # BLD AUTO: 4.8 10E6/UL (ref 3.8–5.2)
RBC URINE: 1 /HPF
RSV RNA SPEC NAA+PROBE: NEGATIVE
SAO2 % BLDV: 47 % (ref 70–75)
SAO2 % BLDV: 66 % (ref 70–75)
SAO2 % BLDV: 77 % (ref 70–75)
SARS-COV-2 RNA RESP QL NAA+PROBE: NEGATIVE
SODIUM SERPL-SCNC: 135 MMOL/L (ref 135–145)
SP GR UR STRIP: 1.01 (ref 1–1.03)
SQUAMOUS EPITHELIAL: 2 /HPF
UROBILINOGEN UR STRIP-MCNC: NORMAL MG/DL
WBC # BLD AUTO: 13.3 10E3/UL (ref 4–11)
WBC URINE: 3 /HPF

## 2024-03-27 PROCEDURE — 82010 KETONE BODYS QUAN: CPT | Performed by: EMERGENCY MEDICINE

## 2024-03-27 PROCEDURE — 250N000011 HC RX IP 250 OP 636: Performed by: EMERGENCY MEDICINE

## 2024-03-27 PROCEDURE — 87637 SARSCOV2&INF A&B&RSV AMP PRB: CPT | Performed by: EMERGENCY MEDICINE

## 2024-03-27 PROCEDURE — 250N000013 HC RX MED GY IP 250 OP 250 PS 637: Performed by: EMERGENCY MEDICINE

## 2024-03-27 PROCEDURE — 36415 COLL VENOUS BLD VENIPUNCTURE: CPT | Performed by: EMERGENCY MEDICINE

## 2024-03-27 PROCEDURE — 87651 STREP A DNA AMP PROBE: CPT | Performed by: EMERGENCY MEDICINE

## 2024-03-27 PROCEDURE — 81001 URINALYSIS AUTO W/SCOPE: CPT | Performed by: EMERGENCY MEDICINE

## 2024-03-27 PROCEDURE — 80048 BASIC METABOLIC PNL TOTAL CA: CPT | Performed by: EMERGENCY MEDICINE

## 2024-03-27 PROCEDURE — 82962 GLUCOSE BLOOD TEST: CPT

## 2024-03-27 PROCEDURE — 96361 HYDRATE IV INFUSION ADD-ON: CPT

## 2024-03-27 PROCEDURE — 85025 COMPLETE CBC W/AUTO DIFF WBC: CPT | Performed by: EMERGENCY MEDICINE

## 2024-03-27 PROCEDURE — 83605 ASSAY OF LACTIC ACID: CPT | Performed by: EMERGENCY MEDICINE

## 2024-03-27 PROCEDURE — 96375 TX/PRO/DX INJ NEW DRUG ADDON: CPT

## 2024-03-27 PROCEDURE — 258N000003 HC RX IP 258 OP 636: Performed by: EMERGENCY MEDICINE

## 2024-03-27 PROCEDURE — 82803 BLOOD GASES ANY COMBINATION: CPT

## 2024-03-27 PROCEDURE — 96374 THER/PROPH/DIAG INJ IV PUSH: CPT

## 2024-03-27 RX ORDER — CEFTRIAXONE 2 G/1
2 INJECTION, POWDER, FOR SOLUTION INTRAMUSCULAR; INTRAVENOUS ONCE
Status: COMPLETED | OUTPATIENT
Start: 2024-03-27 | End: 2024-03-27

## 2024-03-27 RX ORDER — AMOXICILLIN 500 MG/1
500 CAPSULE ORAL 2 TIMES DAILY
Qty: 20 CAPSULE | Refills: 0 | Status: SHIPPED | OUTPATIENT
Start: 2024-03-27 | End: 2024-04-06

## 2024-03-27 RX ORDER — IBUPROFEN 600 MG/1
600 TABLET, FILM COATED ORAL ONCE
Status: COMPLETED | OUTPATIENT
Start: 2024-03-27 | End: 2024-03-27

## 2024-03-27 RX ORDER — DIPHENHYDRAMINE HYDROCHLORIDE 50 MG/ML
25 INJECTION INTRAMUSCULAR; INTRAVENOUS ONCE
Status: COMPLETED | OUTPATIENT
Start: 2024-03-27 | End: 2024-03-27

## 2024-03-27 RX ADMIN — PROCHLORPERAZINE EDISYLATE 10 MG: 5 INJECTION INTRAMUSCULAR; INTRAVENOUS at 02:37

## 2024-03-27 RX ADMIN — IBUPROFEN 600 MG: 600 TABLET, FILM COATED ORAL at 00:12

## 2024-03-27 RX ADMIN — SODIUM CHLORIDE, POTASSIUM CHLORIDE, SODIUM LACTATE AND CALCIUM CHLORIDE 1000 ML: 600; 310; 30; 20 INJECTION, SOLUTION INTRAVENOUS at 03:50

## 2024-03-27 RX ADMIN — CEFTRIAXONE 2 G: 2 INJECTION, POWDER, FOR SOLUTION INTRAMUSCULAR; INTRAVENOUS at 06:08

## 2024-03-27 RX ADMIN — SODIUM CHLORIDE, POTASSIUM CHLORIDE, SODIUM LACTATE AND CALCIUM CHLORIDE 1000 ML: 600; 310; 30; 20 INJECTION, SOLUTION INTRAVENOUS at 01:52

## 2024-03-27 RX ADMIN — DIPHENHYDRAMINE HYDROCHLORIDE 25 MG: 50 INJECTION, SOLUTION INTRAMUSCULAR; INTRAVENOUS at 02:37

## 2024-03-27 ASSESSMENT — ACTIVITIES OF DAILY LIVING (ADL)
ADLS_ACUITY_SCORE: 35
ADLS_ACUITY_SCORE: 37

## 2024-03-27 ASSESSMENT — ENCOUNTER SYMPTOMS
VOMITING: 0
TROUBLE SWALLOWING: 0
PHOTOPHOBIA: 0
NECK PAIN: 0
RHINORRHEA: 0
MYALGIAS: 1
DYSURIA: 0
HEADACHES: 1
DIZZINESS: 0
NECK STIFFNESS: 0
BLOOD IN STOOL: 0
HEMATURIA: 0
COUGH: 0
FEVER: 1
SORE THROAT: 1
NAUSEA: 0
SHORTNESS OF BREATH: 0
DIARRHEA: 0
DIFFICULTY URINATING: 0

## 2024-03-27 NOTE — ED PROVIDER NOTES
History     Chief Complaint:  Flu Symptoms       The history is provided by the patient.      Genesis Brennan is a 38 year old female who presents to the ED for sore throat, fever, headache, and myalgias that began this evening. Reports taking Tylenol approximately 3 hours ago. She denies runny nose, congestion, neck stiffness, light sensitivity, nausea, vomiting, hematuria, dysuria, hematochezia, diarrhea or light sensitivity. She denies sick contacts at home. Reports history of type 2 diabetes in which she does not regularly check her blood glucose.     Review of Systems   Constitutional:  Positive for fever.   HENT:  Positive for sore throat. Negative for congestion, rhinorrhea and trouble swallowing.    Eyes:  Negative for photophobia and visual disturbance.   Respiratory:  Negative for cough and shortness of breath.    Cardiovascular:  Negative for chest pain.   Gastrointestinal:  Negative for blood in stool, diarrhea, nausea and vomiting.   Genitourinary:  Negative for difficulty urinating, dysuria and hematuria.   Musculoskeletal:  Positive for myalgias. Negative for neck pain and neck stiffness.   Skin:  Negative for rash.   Neurological:  Positive for headaches. Negative for dizziness.       Independent Historian:   None - Patient Only    Review of External Notes:   12/26/23  Visit note     Medications:    Metformin    Past Medical History:    Anemia  Hyperlipidemia  Hypothyroidism  Psoriasis  Type 2 diabetes    Past Surgical History:    Breast lumpectomy left     Physical Exam   Patient Vitals for the past 24 hrs:   BP Temp Temp src Pulse Resp SpO2 Height Weight   03/27/24 0622 131/86 -- -- 88 -- 99 % -- --   03/27/24 0603 -- -- -- -- -- 98 % -- --   03/27/24 0602 -- -- -- -- -- 99 % -- --   03/27/24 0601 -- -- -- -- -- 99 % -- --   03/27/24 0600 114/88 -- -- 93 -- 98 % -- --   03/27/24 0526 131/84 -- -- 91 -- 99 % -- --   03/27/24 0521 -- 98.1  F (36.7  C) Oral -- -- -- -- --   03/27/24 0505 -- -- --  "-- -- 100 % -- --   03/27/24 0504 -- -- -- -- -- 99 % -- --   03/27/24 0503 -- -- -- -- -- 99 % -- --   03/27/24 0502 -- -- -- -- -- 100 % -- --   03/27/24 0500 -- -- -- -- -- 100 % -- --   03/27/24 0423 -- -- -- -- -- 97 % -- --   03/27/24 0416 -- -- -- -- -- 96 % -- --   03/27/24 0412 -- -- -- -- -- 97 % -- --   03/27/24 0400 123/85 -- -- 96 -- 96 % -- --   03/27/24 0330 131/75 -- -- 96 -- 95 % -- --   03/27/24 0300 123/78 -- -- 100 -- 95 % -- --   03/27/24 0230 126/79 -- -- 102 -- 95 % -- --   03/27/24 0200 134/87 -- -- 108 -- 96 % -- --   03/27/24 0130 (!) 135/90 -- -- (!) 128 -- 98 % -- --   03/27/24 0004 (!) 142/77 (!) 102.5  F (39.2  C) Oral 118 20 97 % 1.727 m (5' 8\") 81.6 kg (180 lb)        Constitutional:       Appearance: Normal appearance.   HENT:      Head: Normocephalic and atraumatic.      Mouth: Posterior oropharyngeal erythema without associated swelling or exudates. No evidence of peritonsillar abscess. Midline uvula.  Eyes:      Extraocular Movements: Extraocular movements intact.      Conjunctiva/sclera: Conjunctivae normal.   Cardiovascular:      Rate and Rhythm: Tachycardic rate and regular rhythm.   Pulmonary:      Effort: Pulmonary effort is normal.  No respiratory distress.      Breath sounds: Clear to auscultation bilaterally.  Abdominal:      General: Abdomen is flat. There is no distension.      Palpations: Abdomen is soft.      Tenderness: There is no abdominal tenderness.   Musculoskeletal:      Cervical back: Normal range of motion. No rigidity.       Right lower leg: No edema.      Left lower leg: No edema.   Skin:     General: Skin is warm and dry.   Neurological:      General: No focal deficit present.      Mental Status: Alert and oriented to person, place, and time.   Psychiatric:         Mood and Affect: Mood normal.         Behavior: Behavior normal.    Emergency Department Course     Laboratory:  Labs Ordered and Resulted from Time of ED Arrival to Time of ED Departure   BASIC " METABOLIC PANEL - Abnormal       Result Value    Sodium 135      Potassium 4.1      Chloride 99      Carbon Dioxide (CO2) 22      Anion Gap 14      Urea Nitrogen 9.3      Creatinine 0.63      GFR Estimate >90      Calcium 9.5      Glucose 200 (*)    CBC WITH PLATELETS AND DIFFERENTIAL - Abnormal    WBC Count 13.3 (*)     RBC Count 4.80      Hemoglobin 12.3      Hematocrit 37.3      MCV 78      MCH 25.6 (*)     MCHC 33.0      RDW 12.8      Platelet Count 299      % Neutrophils 87      % Lymphocytes 6      % Monocytes 6      % Eosinophils 0      % Basophils 0      % Immature Granulocytes 1      NRBCs per 100 WBC 0      Absolute Neutrophils 11.5 (*)     Absolute Lymphocytes 0.8      Absolute Monocytes 0.8      Absolute Eosinophils 0.0      Absolute Basophils 0.0      Absolute Immature Granulocytes 0.1      Absolute NRBCs 0.0     GLUCOSE BY METER - Abnormal    GLUCOSE BY METER POCT 171 (*)    ISTAT GASES LACTATE VENOUS POCT - Abnormal    Lactic Acid POCT 2.1 (*)     Bicarbonate Venous POCT 22      O2 Sat, Venous POCT 77 (*)     pCO2 Venous POCT 35 (*)     pH Venous POCT 7.40      pO2 Venous POCT 41     ROUTINE UA WITH MICROSCOPIC REFLEX TO CULTURE - Abnormal    Color Urine Straw      Appearance Urine Clear      Glucose Urine Negative      Bilirubin Urine Negative      Ketones Urine Negative      Specific Gravity Urine 1.006      Blood Urine Negative      pH Urine 5.5      Protein Albumin Urine Negative      Urobilinogen Urine Normal      Nitrite Urine Negative      Leukocyte Esterase Urine Small (*)     RBC Urine 1      WBC Urine 3      Squamous Epithelials Urine 2 (*)    ISTAT GASES LACTATE VENOUS POCT - Abnormal    Lactic Acid POCT 2.2 (*)     Bicarbonate Venous POCT 25      O2 Sat, Venous POCT 66 (*)     pCO2 Venous POCT 43      pH Venous POCT 7.37      pO2 Venous POCT 36     LACTIC ACID WHOLE BLOOD - Abnormal    Lactic Acid 2.7 (*)    ISTAT GASES LACTATE VENOUS POCT - Abnormal    Lactic Acid POCT 3.1 (*)      Bicarbonate Venous POCT 21      O2 Sat, Venous POCT 47 (*)     pCO2 Venous POCT 40      pH Venous POCT 7.33      pO2 Venous POCT 27     GROUP A STREPTOCOCCUS PCR THROAT SWAB - Abnormal    Group A strep by PCR Detected (*)    INFLUENZA A/B, RSV, & SARS-COV2 PCR - Normal    Influenza A PCR Negative      Influenza B PCR Negative      RSV PCR Negative      SARS CoV2 PCR Negative     KETONE BETA-HYDROXYBUTYRATE QUANTITATIVE, RAPID - Normal    Ketone (Beta-Hydroxybutyrate) Quantitative <0.18     GLUCOSE MONITOR NURSING POCT   LACTIC ACID WHOLE BLOOD      Procedures     Emergency Department Course & Assessments:     Interventions:  Medications   ibuprofen (ADVIL/MOTRIN) tablet 600 mg (600 mg Oral $Given 3/27/24 0012)   lactated ringers BOLUS 1,000 mL (0 mLs Intravenous Stopped 3/27/24 0429)   prochlorperazine (COMPAZINE) injection 10 mg (10 mg Intravenous $Given 3/27/24 0237)   diphenhydrAMINE (BENADRYL) injection 25 mg (25 mg Intravenous $Given 3/27/24 0237)   lactated ringers BOLUS 1,000 mL (0 mLs Intravenous Stopped 3/27/24 0521)   cefTRIAXone (ROCEPHIN) 2 g vial to attach to  ml bag for ADULTS or NS 50 ml bag for PEDS (0 g Intravenous Stopped 3/27/24 0622)        Independent Interpretation (X-rays, CTs, rhythm strip):  None    Assessments/Consultations/Discussion of Management or Tests:  ED Course as of 03/27/24 0737   Wed Mar 27, 2024   0106 Strep Group A PCR(!): Detected   0128 I obtained history and examined the patient as noted above.     0201 Lactic Acid POCT(!): 2.1  May be secondary to dehydration, will re-evaluate after IVF   0201 pH Venous POCT: 7.40  Unlikely DKA   0222 Anion Gap: 14  Unlikely DKA   0520 Lactic Acid POCT(!): 3.1   0602 Lactic Acid(!): 2.7  Down-trending. Likely component of dehydration. Clinically, patient appears well and does not appear septic or toxic.  DKA ruled out.  Offered patient observation admission for IV antibiotics and further monitoring and repeat lab work.  Patient  however declined this and would prefer to be discharged home due to having a 3-month-old at home in addition to having to bring her kids to school.  Patient states that she will return to the ER immediately if her symptoms do not improve.  I do not believe this is unreasonable at this time.  Will give first dose of IV antibiotics in the ER and discharged with amoxicillin.  Discussed strict return precautions.  Answered all questions.  Patient voiced understanding and agreement with plan.   0610 On reevaluation, patient receiving antibiotics.  Feeling better.  No evidence of toxic shock syndrome.  Patient denies any rashes.  Patient feels that her lactic acidemia is likely secondary to dehydration as she has not had much to drink today or yesterday due to her sore throat and does feel like she was dehydrated. Patient still feels comfortable with discharge home.  Will send prescription to requested pharmacy after ED dose of antibiotics.  Discussed strict return precautions.  Answered all questions.  Patient voiced understanding and agreement with plan.   0628 Patient well-appearing.  Again reiterated return precautions and discharge instructions.  Patient reports that her  is well informed on her visit in the ER today and we will keep an eye on her.  She will continue to monitor symptoms at home and return to the ER immediately if not feeling better.  I do not believe this is unreasonable especially given continued improvement in vital signs and clinical appearance.       Social Determinants of Health affecting care:   None    Disposition:  The patient was discharged to home.     Impression & Plan    CMS Diagnoses: The Lactic acid level is elevated due to dehydration, at this time there is no sign of severe sepsis or septic shock. No evidence of DKA.    MIPS (If applicable):  N/A    Medical Decision Makin-year-old female as described above presents to the emergency department with myalgia, sore throat,  headache, cough, and rhinorrhea.  Patient hemodynamically stable at time of evaluation.  Tachycardic.  Initial temperature 102.5  F which may likely contribute to patient's tachycardia.  Patient received ibuprofen which has helped out with some for myalgia especially in conjunction with Tylenol she took prior to arrival.  Strep swab resulted positive.  Viral swab negative.  Suspect symptoms today likely secondary to acute viral syndrome in addition to streptococcal pharyngitis.  No evidence of exudates or peritonsillar abscess on my exam warranting need for acute intervention.  Will prescribe antibiotics the patient is otherwise cleared for discharge.  Nonetheless, patient reports that she has a history of type 2 diabetes and with metformin controlled, but has not been checking her blood sugars recently.  Will screen for DKA.  IV fluid hydration.  No meningismus at this time suggest meningitis. Discussed care plan with patient voiced understanding and agreement with plan.  Additional workup and orders as listed in chart.    Please refer to ED course above as part of continuation of MDM for details on the patient's treatment course and any changes or updates in care plan beyond my initial evaluation and MDM creation.      Diagnosis:    ICD-10-CM    1. Acute streptococcal pharyngitis  J02.0       2. Elevated lactic acid level  R79.89       3. Mild dehydration  E86.0            Discharge Medications:  Discharge Medication List as of 3/27/2024  6:22 AM        START taking these medications    Details   amoxicillin (AMOXIL) 500 MG capsule Take 1 capsule (500 mg) by mouth 2 times daily for 10 days, Disp-20 capsule, R-0, E-Prescribe                Scribe Disclosure:  Elizabeth JEROME, am serving as a scribe at 1:36 AM on 3/27/2024 to document services personally performed by Milad Del Angel DO based on my observations and the provider's statements to me.     3/27/2024   Milad Del Angel DO Yeh, Ferris, DO  03/27/24  0731

## 2024-03-27 NOTE — ED TRIAGE NOTES
Patient arrives complaining of chills, body aches, cough, not feeling well, headache, and nausea.   No vomiting. Last dose of tylenol at 2230. Taking theraflu at home.      Triage Assessment (Adult)       Row Name 03/27/24 0002          Triage Assessment    Airway WDL WDL        Respiratory WDL    Respiratory WDL X;cough        Skin Circulation/Temperature WDL    Skin Circulation/Temperature WDL WDL        Cardiac WDL    Cardiac WDL WDL        Peripheral/Neurovascular WDL    Peripheral Neurovascular WDL WDL        Cognitive/Neuro/Behavioral WDL    Cognitive/Neuro/Behavioral WDL X  HA

## 2024-06-08 ENCOUNTER — APPOINTMENT (OUTPATIENT)
Dept: GENERAL RADIOLOGY | Facility: CLINIC | Age: 39
End: 2024-06-08
Attending: EMERGENCY MEDICINE
Payer: COMMERCIAL

## 2024-06-08 ENCOUNTER — APPOINTMENT (OUTPATIENT)
Dept: CT IMAGING | Facility: CLINIC | Age: 39
End: 2024-06-08
Attending: EMERGENCY MEDICINE
Payer: COMMERCIAL

## 2024-06-08 ENCOUNTER — HOSPITAL ENCOUNTER (OUTPATIENT)
Facility: CLINIC | Age: 39
Setting detail: OBSERVATION
Discharge: HOME | End: 2024-06-10
Attending: EMERGENCY MEDICINE | Admitting: INTERNAL MEDICINE
Payer: COMMERCIAL

## 2024-06-08 DIAGNOSIS — R00.0 TACHYCARDIA: ICD-10-CM

## 2024-06-08 DIAGNOSIS — J11.1 INFLUENZA-LIKE ILLNESS: ICD-10-CM

## 2024-06-08 LAB
ALBUMIN SERPL BCG-MCNC: 4.5 G/DL (ref 3.5–5.2)
ALBUMIN UR-MCNC: NEGATIVE MG/DL
ALP SERPL-CCNC: 87 U/L (ref 40–150)
ALT SERPL W P-5'-P-CCNC: 44 U/L (ref 0–50)
ANION GAP SERPL CALCULATED.3IONS-SCNC: 15 MMOL/L (ref 7–15)
APPEARANCE UR: CLEAR
AST SERPL W P-5'-P-CCNC: 34 U/L (ref 0–45)
B-OH-BUTYR SERPL-SCNC: <0.18 MMOL/L
BASOPHILS # BLD AUTO: 0 10E3/UL (ref 0–0.2)
BASOPHILS NFR BLD AUTO: 0 %
BILIRUB SERPL-MCNC: 0.8 MG/DL
BILIRUB UR QL STRIP: NEGATIVE
BUN SERPL-MCNC: 8.9 MG/DL (ref 6–20)
CALCIUM SERPL-MCNC: 9.6 MG/DL (ref 8.6–10)
CHLORIDE SERPL-SCNC: 99 MMOL/L (ref 98–107)
COLOR UR AUTO: ABNORMAL
CREAT SERPL-MCNC: 0.63 MG/DL (ref 0.51–0.95)
DEPRECATED HCO3 PLAS-SCNC: 20 MMOL/L (ref 22–29)
EGFRCR SERPLBLD CKD-EPI 2021: >90 ML/MIN/1.73M2
EOSINOPHIL # BLD AUTO: 0 10E3/UL (ref 0–0.7)
EOSINOPHIL NFR BLD AUTO: 0 %
ERYTHROCYTE [DISTWIDTH] IN BLOOD BY AUTOMATED COUNT: 13.2 % (ref 10–15)
FLUAV RNA SPEC QL NAA+PROBE: NEGATIVE
FLUBV RNA RESP QL NAA+PROBE: NEGATIVE
GLUCOSE BLDC GLUCOMTR-MCNC: 239 MG/DL (ref 70–99)
GLUCOSE SERPL-MCNC: 225 MG/DL (ref 70–99)
GLUCOSE UR STRIP-MCNC: >=1000 MG/DL
GROUP A STREP BY PCR: NOT DETECTED
HCG SERPL QL: NEGATIVE
HCO3 BLDV-SCNC: 22 MMOL/L (ref 21–28)
HCT VFR BLD AUTO: 37.8 % (ref 35–47)
HGB BLD-MCNC: 12.5 G/DL (ref 11.7–15.7)
HGB UR QL STRIP: NEGATIVE
IMM GRANULOCYTES # BLD: 0.1 10E3/UL
IMM GRANULOCYTES NFR BLD: 1 %
KETONES UR STRIP-MCNC: ABNORMAL MG/DL
LACTATE BLD-SCNC: 2.4 MMOL/L
LACTATE SERPL-SCNC: 1.9 MMOL/L (ref 0.7–2)
LEUKOCYTE ESTERASE UR QL STRIP: NEGATIVE
LYMPHOCYTES # BLD AUTO: 0.5 10E3/UL (ref 0.8–5.3)
LYMPHOCYTES NFR BLD AUTO: 4 %
MCH RBC QN AUTO: 25.4 PG (ref 26.5–33)
MCHC RBC AUTO-ENTMCNC: 33.1 G/DL (ref 31.5–36.5)
MCV RBC AUTO: 77 FL (ref 78–100)
MONOCYTES # BLD AUTO: 0.7 10E3/UL (ref 0–1.3)
MONOCYTES NFR BLD AUTO: 6 %
NEUTROPHILS # BLD AUTO: 10.4 10E3/UL (ref 1.6–8.3)
NEUTROPHILS NFR BLD AUTO: 89 %
NITRATE UR QL: NEGATIVE
NRBC # BLD AUTO: 0 10E3/UL
NRBC BLD AUTO-RTO: 0 /100
PCO2 BLDV: 32 MM HG (ref 40–50)
PH BLDV: 7.43 [PH] (ref 7.32–7.43)
PH UR STRIP: 7.5 [PH] (ref 5–7)
PLATELET # BLD AUTO: 249 10E3/UL (ref 150–450)
PO2 BLDV: 26 MM HG (ref 25–47)
POTASSIUM SERPL-SCNC: 3.9 MMOL/L (ref 3.4–5.3)
PROCALCITONIN SERPL IA-MCNC: 0.1 NG/ML
PROT SERPL-MCNC: 8.1 G/DL (ref 6.4–8.3)
RBC # BLD AUTO: 4.92 10E6/UL (ref 3.8–5.2)
RBC URINE: <1 /HPF
RSV RNA SPEC NAA+PROBE: NEGATIVE
SAO2 % BLDV: 52 % (ref 70–75)
SARS-COV-2 RNA RESP QL NAA+PROBE: NEGATIVE
SODIUM SERPL-SCNC: 134 MMOL/L (ref 135–145)
SP GR UR STRIP: 1.01 (ref 1–1.03)
SQUAMOUS EPITHELIAL: 2 /HPF
UROBILINOGEN UR STRIP-MCNC: 2 MG/DL
WBC # BLD AUTO: 11.7 10E3/UL (ref 4–11)
WBC URINE: 1 /HPF

## 2024-06-08 PROCEDURE — 82803 BLOOD GASES ANY COMBINATION: CPT

## 2024-06-08 PROCEDURE — 83605 ASSAY OF LACTIC ACID: CPT | Performed by: EMERGENCY MEDICINE

## 2024-06-08 PROCEDURE — 82010 KETONE BODYS QUAN: CPT | Performed by: EMERGENCY MEDICINE

## 2024-06-08 PROCEDURE — 36415 COLL VENOUS BLD VENIPUNCTURE: CPT | Performed by: EMERGENCY MEDICINE

## 2024-06-08 PROCEDURE — 85025 COMPLETE CBC W/AUTO DIFF WBC: CPT | Performed by: EMERGENCY MEDICINE

## 2024-06-08 PROCEDURE — 70450 CT HEAD/BRAIN W/O DYE: CPT

## 2024-06-08 PROCEDURE — 96365 THER/PROPH/DIAG IV INF INIT: CPT

## 2024-06-08 PROCEDURE — 87040 BLOOD CULTURE FOR BACTERIA: CPT | Performed by: EMERGENCY MEDICINE

## 2024-06-08 PROCEDURE — 84703 CHORIONIC GONADOTROPIN ASSAY: CPT | Performed by: EMERGENCY MEDICINE

## 2024-06-08 PROCEDURE — 81001 URINALYSIS AUTO W/SCOPE: CPT | Performed by: EMERGENCY MEDICINE

## 2024-06-08 PROCEDURE — 96375 TX/PRO/DX INJ NEW DRUG ADDON: CPT

## 2024-06-08 PROCEDURE — 250N000013 HC RX MED GY IP 250 OP 250 PS 637: Performed by: EMERGENCY MEDICINE

## 2024-06-08 PROCEDURE — 82962 GLUCOSE BLOOD TEST: CPT

## 2024-06-08 PROCEDURE — 96376 TX/PRO/DX INJ SAME DRUG ADON: CPT

## 2024-06-08 PROCEDURE — 71046 X-RAY EXAM CHEST 2 VIEWS: CPT

## 2024-06-08 PROCEDURE — 99285 EMERGENCY DEPT VISIT HI MDM: CPT | Mod: 25

## 2024-06-08 PROCEDURE — 87086 URINE CULTURE/COLONY COUNT: CPT | Performed by: NURSE PRACTITIONER

## 2024-06-08 PROCEDURE — 96361 HYDRATE IV INFUSION ADD-ON: CPT

## 2024-06-08 PROCEDURE — 82040 ASSAY OF SERUM ALBUMIN: CPT | Performed by: EMERGENCY MEDICINE

## 2024-06-08 PROCEDURE — 87651 STREP A DNA AMP PROBE: CPT | Performed by: EMERGENCY MEDICINE

## 2024-06-08 PROCEDURE — 250N000011 HC RX IP 250 OP 636: Mod: JZ | Performed by: EMERGENCY MEDICINE

## 2024-06-08 PROCEDURE — 258N000003 HC RX IP 258 OP 636: Mod: JZ | Performed by: EMERGENCY MEDICINE

## 2024-06-08 PROCEDURE — 84145 PROCALCITONIN (PCT): CPT | Performed by: EMERGENCY MEDICINE

## 2024-06-08 PROCEDURE — 87637 SARSCOV2&INF A&B&RSV AMP PRB: CPT | Performed by: EMERGENCY MEDICINE

## 2024-06-08 RX ORDER — CEFTRIAXONE 1 G/1
1 INJECTION, POWDER, FOR SOLUTION INTRAMUSCULAR; INTRAVENOUS ONCE
Status: COMPLETED | OUTPATIENT
Start: 2024-06-08 | End: 2024-06-08

## 2024-06-08 RX ORDER — TRIAMCINOLONE ACETONIDE 1 MG/G
CREAM TOPICAL 2 TIMES DAILY
COMMUNITY

## 2024-06-08 RX ORDER — DIPHENHYDRAMINE HYDROCHLORIDE 50 MG/ML
25 INJECTION INTRAMUSCULAR; INTRAVENOUS ONCE
Status: COMPLETED | OUTPATIENT
Start: 2024-06-08 | End: 2024-06-08

## 2024-06-08 RX ORDER — IBUPROFEN 600 MG/1
600 TABLET, FILM COATED ORAL ONCE
Status: COMPLETED | OUTPATIENT
Start: 2024-06-08 | End: 2024-06-08

## 2024-06-08 RX ORDER — ONDANSETRON 2 MG/ML
INJECTION INTRAMUSCULAR; INTRAVENOUS
Status: COMPLETED
Start: 2024-06-08 | End: 2024-06-08

## 2024-06-08 RX ORDER — ONDANSETRON 2 MG/ML
4 INJECTION INTRAMUSCULAR; INTRAVENOUS
Status: DISCONTINUED | OUTPATIENT
Start: 2024-06-08 | End: 2024-06-09

## 2024-06-08 RX ORDER — METOCLOPRAMIDE HYDROCHLORIDE 5 MG/ML
10 INJECTION INTRAMUSCULAR; INTRAVENOUS ONCE
Status: COMPLETED | OUTPATIENT
Start: 2024-06-08 | End: 2024-06-08

## 2024-06-08 RX ORDER — ACETAMINOPHEN 500 MG
1000 TABLET ORAL ONCE
Status: COMPLETED | OUTPATIENT
Start: 2024-06-08 | End: 2024-06-08

## 2024-06-08 RX ORDER — GLIPIZIDE 2.5 MG/1
2.5 TABLET, EXTENDED RELEASE ORAL DAILY
COMMUNITY

## 2024-06-08 RX ADMIN — SODIUM CHLORIDE 1000 ML: 9 INJECTION, SOLUTION INTRAVENOUS at 21:05

## 2024-06-08 RX ADMIN — METOCLOPRAMIDE HYDROCHLORIDE 10 MG: 5 INJECTION INTRAMUSCULAR; INTRAVENOUS at 22:31

## 2024-06-08 RX ADMIN — ONDANSETRON 4 MG: 2 INJECTION INTRAMUSCULAR; INTRAVENOUS at 20:47

## 2024-06-08 RX ADMIN — IBUPROFEN 600 MG: 600 TABLET, FILM COATED ORAL at 19:21

## 2024-06-08 RX ADMIN — DIPHENHYDRAMINE HYDROCHLORIDE 25 MG: 50 INJECTION INTRAMUSCULAR; INTRAVENOUS at 22:31

## 2024-06-08 RX ADMIN — ONDANSETRON 4 MG: 2 INJECTION INTRAMUSCULAR; INTRAVENOUS at 22:01

## 2024-06-08 RX ADMIN — CEFTRIAXONE 1 G: 1 INJECTION, POWDER, FOR SOLUTION INTRAMUSCULAR; INTRAVENOUS at 21:36

## 2024-06-08 RX ADMIN — SODIUM CHLORIDE 1000 ML: 9 INJECTION, SOLUTION INTRAVENOUS at 20:28

## 2024-06-08 RX ADMIN — ACETAMINOPHEN 1000 MG: 500 TABLET, FILM COATED ORAL at 20:12

## 2024-06-08 ASSESSMENT — ACTIVITIES OF DAILY LIVING (ADL)
ADLS_ACUITY_SCORE: 37

## 2024-06-08 ASSESSMENT — COLUMBIA-SUICIDE SEVERITY RATING SCALE - C-SSRS
1. IN THE PAST MONTH, HAVE YOU WISHED YOU WERE DEAD OR WISHED YOU COULD GO TO SLEEP AND NOT WAKE UP?: NO
6. HAVE YOU EVER DONE ANYTHING, STARTED TO DO ANYTHING, OR PREPARED TO DO ANYTHING TO END YOUR LIFE?: NO
2. HAVE YOU ACTUALLY HAD ANY THOUGHTS OF KILLING YOURSELF IN THE PAST MONTH?: NO

## 2024-06-09 PROBLEM — R00.0 TACHYCARDIA: Status: ACTIVE | Noted: 2024-06-09

## 2024-06-09 PROBLEM — J11.1 INFLUENZA-LIKE ILLNESS: Status: ACTIVE | Noted: 2024-06-09

## 2024-06-09 LAB
ANION GAP SERPL CALCULATED.3IONS-SCNC: 13 MMOL/L (ref 7–15)
BUN SERPL-MCNC: 4.9 MG/DL (ref 6–20)
CALCIUM SERPL-MCNC: 8.4 MG/DL (ref 8.6–10)
CHLORIDE SERPL-SCNC: 105 MMOL/L (ref 98–107)
CREAT SERPL-MCNC: 0.5 MG/DL (ref 0.51–0.95)
DEPRECATED HCO3 PLAS-SCNC: 18 MMOL/L (ref 22–29)
EGFRCR SERPLBLD CKD-EPI 2021: >90 ML/MIN/1.73M2
ERYTHROCYTE [DISTWIDTH] IN BLOOD BY AUTOMATED COUNT: 13.3 % (ref 10–15)
GLUCOSE BLDC GLUCOMTR-MCNC: 212 MG/DL (ref 70–99)
GLUCOSE SERPL-MCNC: 214 MG/DL (ref 70–99)
HBA1C MFR BLD: 9 %
HCT VFR BLD AUTO: 37 % (ref 35–47)
HGB BLD-MCNC: 11.9 G/DL (ref 11.7–15.7)
MCH RBC QN AUTO: 25.8 PG (ref 26.5–33)
MCHC RBC AUTO-ENTMCNC: 32.2 G/DL (ref 31.5–36.5)
MCV RBC AUTO: 80 FL (ref 78–100)
PLATELET # BLD AUTO: 215 10E3/UL (ref 150–450)
POTASSIUM SERPL-SCNC: 4 MMOL/L (ref 3.4–5.3)
RBC # BLD AUTO: 4.61 10E6/UL (ref 3.8–5.2)
SODIUM SERPL-SCNC: 136 MMOL/L (ref 135–145)
WBC # BLD AUTO: 10.6 10E3/UL (ref 4–11)

## 2024-06-09 PROCEDURE — 99222 1ST HOSP IP/OBS MODERATE 55: CPT | Performed by: INTERNAL MEDICINE

## 2024-06-09 PROCEDURE — 83036 HEMOGLOBIN GLYCOSYLATED A1C: CPT | Performed by: INTERNAL MEDICINE

## 2024-06-09 PROCEDURE — G0378 HOSPITAL OBSERVATION PER HR: HCPCS

## 2024-06-09 PROCEDURE — 80048 BASIC METABOLIC PNL TOTAL CA: CPT | Performed by: INTERNAL MEDICINE

## 2024-06-09 PROCEDURE — 250N000013 HC RX MED GY IP 250 OP 250 PS 637: Performed by: NURSE PRACTITIONER

## 2024-06-09 PROCEDURE — 250N000013 HC RX MED GY IP 250 OP 250 PS 637: Performed by: INTERNAL MEDICINE

## 2024-06-09 PROCEDURE — 82962 GLUCOSE BLOOD TEST: CPT

## 2024-06-09 PROCEDURE — 36415 COLL VENOUS BLD VENIPUNCTURE: CPT | Performed by: INTERNAL MEDICINE

## 2024-06-09 PROCEDURE — 258N000003 HC RX IP 258 OP 636: Mod: JZ | Performed by: EMERGENCY MEDICINE

## 2024-06-09 PROCEDURE — 85027 COMPLETE CBC AUTOMATED: CPT | Performed by: INTERNAL MEDICINE

## 2024-06-09 PROCEDURE — 99207 PR NO CHARGE LOS: CPT | Performed by: NURSE PRACTITIONER

## 2024-06-09 RX ORDER — AMOXICILLIN 250 MG
2 CAPSULE ORAL 2 TIMES DAILY PRN
Status: DISCONTINUED | OUTPATIENT
Start: 2024-06-09 | End: 2024-06-10 | Stop reason: HOSPADM

## 2024-06-09 RX ORDER — ACETAMINOPHEN 325 MG/1
650 TABLET ORAL EVERY 4 HOURS PRN
Status: DISCONTINUED | OUTPATIENT
Start: 2024-06-09 | End: 2024-06-10 | Stop reason: HOSPADM

## 2024-06-09 RX ORDER — DEXTROSE MONOHYDRATE 25 G/50ML
25-50 INJECTION, SOLUTION INTRAVENOUS
Status: DISCONTINUED | OUTPATIENT
Start: 2024-06-09 | End: 2024-06-10 | Stop reason: HOSPADM

## 2024-06-09 RX ORDER — AMOXICILLIN 250 MG
1 CAPSULE ORAL 2 TIMES DAILY PRN
Status: DISCONTINUED | OUTPATIENT
Start: 2024-06-09 | End: 2024-06-10 | Stop reason: HOSPADM

## 2024-06-09 RX ORDER — NICOTINE POLACRILEX 4 MG
15-30 LOZENGE BUCCAL
Status: DISCONTINUED | OUTPATIENT
Start: 2024-06-09 | End: 2024-06-10 | Stop reason: HOSPADM

## 2024-06-09 RX ORDER — GLIPIZIDE 2.5 MG/1
2.5 TABLET, EXTENDED RELEASE ORAL DAILY
Status: DISCONTINUED | OUTPATIENT
Start: 2024-06-09 | End: 2024-06-10 | Stop reason: HOSPADM

## 2024-06-09 RX ORDER — POLYETHYLENE GLYCOL 3350 17 G/17G
17 POWDER, FOR SOLUTION ORAL DAILY
Status: DISCONTINUED | OUTPATIENT
Start: 2024-06-09 | End: 2024-06-10 | Stop reason: HOSPADM

## 2024-06-09 RX ORDER — ONDANSETRON 4 MG/1
4 TABLET, ORALLY DISINTEGRATING ORAL EVERY 6 HOURS PRN
Status: DISCONTINUED | OUTPATIENT
Start: 2024-06-09 | End: 2024-06-10 | Stop reason: HOSPADM

## 2024-06-09 RX ORDER — ONDANSETRON 2 MG/ML
4 INJECTION INTRAMUSCULAR; INTRAVENOUS EVERY 6 HOURS PRN
Status: DISCONTINUED | OUTPATIENT
Start: 2024-06-09 | End: 2024-06-10 | Stop reason: HOSPADM

## 2024-06-09 RX ORDER — ACETAMINOPHEN 650 MG/1
650 SUPPOSITORY RECTAL EVERY 4 HOURS PRN
Status: DISCONTINUED | OUTPATIENT
Start: 2024-06-09 | End: 2024-06-10 | Stop reason: HOSPADM

## 2024-06-09 RX ADMIN — ACETAMINOPHEN 650 MG: 325 TABLET, FILM COATED ORAL at 08:17

## 2024-06-09 RX ADMIN — SODIUM CHLORIDE 1000 ML: 9 INJECTION, SOLUTION INTRAVENOUS at 00:02

## 2024-06-09 RX ADMIN — GLIPIZIDE 2.5 MG: 2.5 TABLET, EXTENDED RELEASE ORAL at 16:20

## 2024-06-09 RX ADMIN — POLYETHYLENE GLYCOL 3350 17 G: 17 POWDER, FOR SOLUTION ORAL at 08:17

## 2024-06-09 RX ADMIN — ACETAMINOPHEN 650 MG: 325 TABLET, FILM COATED ORAL at 16:30

## 2024-06-09 ASSESSMENT — ACTIVITIES OF DAILY LIVING (ADL)
ADLS_ACUITY_SCORE: 33
ADLS_ACUITY_SCORE: 37
ADLS_ACUITY_SCORE: 33
ADLS_ACUITY_SCORE: 37
ADLS_ACUITY_SCORE: 33

## 2024-06-09 NOTE — UTILIZATION REVIEW
Concurrent stay review; Secondary Review Determination     [facility]          Under the authority of the Utilization Management Committee, the utilization review process indicated a secondary review on the above patient.  The review outcome is based on review of the medical records, discussions with staff, and applying clinical experience noted on the date of the review.          (x) Observation Status Appropriate - Concurrent stay review    RATIONALE FOR DETERMINATION        The Patient is 37 y/o  woman,  with PMHx significant for  DM2, psoriasis on  ixekizumab presents with fevers, progressive body aches and headache.  The patient received 3 L of IV fluid in the emergency room and empiric ceftriaxone.    The patient remained tachycardic since admission with a heart rate above 120 yesterday, and today most of the numbers in the 110s range, last pulse of 95.  Admission fever 102.7, low-grade temp today 100.4 and 99.1.  Leukocytosis improved.  The generalized body aches are improving, the headache is improving, today at 2/10.  The patient refuses LP.  Lactic acid is normalized after IV fluids given in ER.  Covid/flu/rsv negative, Rapid strep negative   UA trace ketones, glucose, no inflammatory findings  CXR negative     38-year-old woman immunocompromised secondary to DM2 and ixekizumab treatment presents to ER with early signs of sepsis: Fever 102.7, persistent tachycardia, elevated LR, leukocytosis.  She received 1 dose of IV antibiotic and IV fluids.  She is feeling much better and the lab results normalized.  No focal signs of infection was found for her fever.    Patient is clinically improving and there is no clear indication to change patient's status to inpatient. The severity of illness, intensity of service provided, expected LOS and risk for adverse outcome make the care appropriate for observation.      This document was produced using voice recognition software       The information  on this document is developed by the utilization review team in order for the business office to ensure compliance.  This only denotes the appropriateness of proper admission status and does not reflect the quality of care rendered.         The definitions of Inpatient Status and Observation Status used in making the determination above are those provided in the CMS Coverage Manual, Chapter 1 and Chapter 6, section 70.4.      Sincerely,     JOSE GUZMAN MD   Utilization Review  Physician Advisor  [facility]

## 2024-06-09 NOTE — H&P
History and Physical     Genesis Brennan MRN# [id number]   YOB: 1985 Age: 38 year old      Date of Admission:  6/8/2024    Primary care provider: No Ref-Primary, Physician          Assessment and Plan:     Summary of Stay: Genesis Brennan is a 38 year old female with a history of  T2dm, psoriasis on  ixekizumab admitted on 6/8/2024 with fevers, progressive body aches, and a headache    She went to [facility] on 6/7 and then later that evening noticed the onset of generalized malaise and body aches. She had a mild sore throat as well and that's already resolved. On the day of presentation had tactile fevers.  She noticed no appetite, so mild nausea without vomiting, and no vomiting.      Headache started on the night of presentation and is just the top of her head, mostly throbbing in nature, and noted to occur after arriving in the ER and has responded to apap    Denies nc/ea/st, no cough/sob, no dysuria/urgency/frequency, no joint pains, no neck stiffness/neck pain.     ER T 102.7 with associated tachycardia into the 130's, BPs and respiratory status ok   CMP with CO@ 20 nl AG @ 15,  CBC with mild leukocytosis 11.7 with lymphopenia and neutrophilia.  LA 2.4 , procal 0.1  Covid/flu/rsv negative  Rapid strep negative   UA trace ketones, glucose, no inflammatory findings  CXR negative  NCHCT negative    She was given 3 L of IVF, empiric ceftriaxone     I am asked to admit for fever/sepsis without source identified      Problem List:   Fever, generalized malaise  Sepsis fever/tachy/leukocytosis/elevated LA  Considered immunocompromised in the setting of being on ixekizumab  Sx certainly suggest a viral process although in one who is compromised she may not be manifesting appropriate features. Lymphopenia would also be suggestive of viral process, although also with neutrophilia.   She doesn't have any clear localizing sx to give us a clue.  She was encouraged by Dr Pepper and me to have an LP  but she refused for now but will think about it.   -LA normalized after 3 L IVF   -will hold on additional abx as I'm not sure what I'm treating     T2dm  Chronically on semaglutide   -takes weekly and has already taken this week  -BG cks bid and notify MD if > 300     Psoriasos   Chronically on ixekizumab-takes monthly           DVT Prophylaxis: Pneumatic Compression Devices  Code Status: Full Code  Functional Status: independent  Milner: not needed  Access:   PIV              Time spent 60 minutes reviewing epic including notes/labs/prior hx, current medications.  In addition to interviewing and examining the patient, updated patient and family regarding plan of care          Chief Complaint:     Body aches        History of Present Illness:    Genesis Brennan is a 38 year old female with a history of  T2dm, psoriasis on  ixekizumab admitted on 6/8/2024 with fevers, progressive body aches, and a headache    She went to [facility] on 6/7 and then later that evening noticed the onset of generalized malaise and body aches. She had a mild sore throat as well and that's already resolved. On the day of presentation had tactile fevers.  She noticed no appetite, so mild nausea without vomiting, and no vomiting.      Headache started on the night of presentation and is just the top of her head, mostly throbbing in nature, and noted to occur after arriving in the ER and has responded to apap    Denies nc/ea/st, no cough/sob, no dysuria/urgency/frequency, no joint pains, no neck stiffness/neck pain.     ER T 102.7 with associated tachycardia into the 130's, BPs and respiratory status ok   CMP with CO@ 20 nl AG @ 15,  CBC with mild leukocytosis 11.7 with lymphopenia and neutrophilia.  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    Psoriasis     sees Derm    Type 2 diabetes mellitus 10/18/2015    Vitamin D deficiency              Past Surgical History:     Past Surgical History:   Procedure Laterality Date    LUMPECTOMY BREAST Left 2008             Social History:     Social History     Tobacco Use    Smoking status: Never    Smokeless tobacco: Never   Substance Use Topics    Alcohol use: No             Family History:     Family History   Problem Relation Age of Onset    Hypertension Mother     Kidney failure Brother     Diabetes Brother     Hypertension Brother             Allergies:     Allergies   Allergen Reactions    Metformin     Otezla [Apremilast]              Medications:     Semaglutide  Ixekizumab           Review of Systems:     A Comprehensive greater than 10 system review of systems was carried out.  Pertinent positives and negatives are noted above.  Otherwise negative for contributory information.           Physical Exam:   Blood pressure (!) 138/90, pulse 109, temperature 98.4  F (36.9  C), temperature source Oral, resp. rate 18, height 1.727 m (5' 8\"), weight 85.7 kg (188 lb 15 oz), last menstrual period 06/04/2024, SpO2 96%, not currently breastfeeding.  Exam:    General:  Pleasant nad looks stated age  HEENT:  Head nc/at sclera clear PERRL O/P:  Moist mucus membranes no posterior pharyngeal erythema or exudate.  Neck is supple she has full ROM no tenderness to palpation   Lungs: cta b nl effort   CV:  RRR no m/r/g no le edema  Abd:  S/nt/nd no r/g  Neuro:  Cn 2-12 grossly intact and white  Alert and oriented affect appropriate   Skin:  W/d no c/c               Data: "     Results for orders placed or performed during the hospital encounter of 06/08/24   XR Chest 2 Views     Status: None    Narrative    EXAM: XR CHEST 2 VIEWS  LOCATION: [facility]  DATE: 6/8/2024    INDICATION: fever, sepsis  COMPARISON: Chest film 3/29/2017      Impression    IMPRESSION: Negative chest.   CT Head w/o Contrast     Status: None    Narrative    EXAM: CT HEAD W/O CONTRAST  LOCATION: [facility]  DATE: 6/8/2024    INDICATION: headache  COMPARISON: None.  TECHNIQUE: Routine CT Head without IV contrast. Multiplanar reformats. Dose reduction techniques were used.    FINDINGS:  INTRACRANIAL CONTENTS: No intracranial hemorrhage, extraaxial collection, or mass effect.  No CT evidence of acute infarct. Normal parenchymal attenuation. Normal ventricles and sulci.     VISUALIZED ORBITS/SINUSES/MASTOIDS: No intraorbital abnormality. No paranasal sinus mucosal disease. No middle ear or mastoid effusion.    BONES/SOFT TISSUES: No acute abnormality.      Impression    IMPRESSION:  1.  Normal head CT.   Symptomatic Influenza A/B, RSV, & SARS-CoV2 PCR (COVID-19) Nasopharyngeal     Status: Normal    Specimen: Nasopharyngeal; Swab   Result Value Ref Range    Influenza A PCR Negative Negative    Influenza B PCR Negative Negative    RSV PCR Negative Negative    SARS CoV2 PCR Negative Negative    Narrative    Testing was performed using the Xpert Xpress CoV2/Flu/RSV Assay on the [employer] GeneXpert Instrument. This test should be ordered for the detection of SARS-CoV-2, influenza, and RSV viruses in individuals who meet clinical and/or epidemiological criteria. Test performance is unknown in asymptomatic patients. This test is for in vitro diagnostic use under the FDA EUA for laboratories certified under CLIA to perform high or moderate complexity testing. This test has not been FDA cleared or approved. A negative result does not rule out the presence of PCR inhibitors in the  specimen or target RNA in concentration below the limit of detection for the assay. If only one viral target is positive but coinfection with multiple targets is suspected, the sample should be re-tested with another FDA cleared, approved, or authorized test, if coinfection would change clinical management. This test was validated by the [facility] and [facility]. These laboratories are certified under the Clinical Laboratory Improvement Amendments of 1988 (CLIA-88) as qualified to perform high complexity laboratory testing.   Comprehensive metabolic panel     Status: Abnormal   Result Value Ref Range    Sodium 134 (L) 135 - 145 mmol/L    Potassium 3.9 3.4 - 5.3 mmol/L    Carbon Dioxide (CO2) 20 (L) 22 - 29 mmol/L    Anion Gap 15 7 - 15 mmol/L    Urea Nitrogen 8.9 6.0 - 20.0 mg/dL    Creatinine 0.63 0.51 - 0.95 mg/dL    GFR Estimate >90 >60 mL/min/1.73m2    Calcium 9.6 8.6 - 10.0 mg/dL    Chloride 99 98 - 107 mmol/L    Glucose 225 (H) 70 - 99 mg/dL    Alkaline Phosphatase 87 40 - 150 U/L    AST 34 0 - 45 U/L    ALT 44 0 - 50 U/L    Protein Total 8.1 6.4 - 8.3 g/dL    Albumin 4.5 3.5 - 5.2 g/dL    Bilirubin Total 0.8 <=1.2 mg/dL   Ketone Beta-Hydroxybutyrate Quantitative     Status: Normal   Result Value Ref Range    Ketone (Beta-Hydroxybutyrate) Quantitative <0.18 <=0.30 mmol/L   UA with Microscopic     Status: Abnormal   Result Value Ref Range    Color Urine Light Yellow Colorless, Straw, Light Yellow, Yellow    Appearance Urine Clear Clear    Glucose Urine >=1000 (A) Negative mg/dL    Bilirubin Urine Negative Negative    Ketones Urine Trace (A) Negative mg/dL    Specific Gravity Urine 1.015 1.003 - 1.035    Blood Urine Negative Negative    pH Urine 7.5 (H) 5.0 - 7.0    Protein Albumin Urine Negative Negative mg/dL    Urobilinogen Urine 2.0 Normal, 2.0 mg/dL    Nitrite Urine Negative Negative    Leukocyte Esterase Urine Negative Negative    RBC Urine <1 <=2 /HPF    WBC Urine 1 <=5 /HPF    Squamous Epithelials  Urine 2 (H) <=1 /HPF   CBC with platelets and differential     Status: Abnormal   Result Value Ref Range    WBC Count 11.7 (H) 4.0 - 11.0 10e3/uL    RBC Count 4.92 3.80 - 5.20 10e6/uL    Hemoglobin 12.5 11.7 - 15.7 g/dL    Hematocrit 37.8 35.0 - 47.0 %    MCV 77 (L) 78 - 100 fL    MCH 25.4 (L) 26.5 - 33.0 pg    MCHC 33.1 31.5 - 36.5 g/dL    RDW 13.2 10.0 - 15.0 %    Platelet Count 249 150 - 450 10e3/uL    % Neutrophils 89 %    % Lymphocytes 4 %    % Monocytes 6 %    % Eosinophils 0 %    % Basophils 0 %    % Immature Granulocytes 1 %    NRBCs per 100 WBC 0 <1 /100    Absolute Neutrophils 10.4 (H) 1.6 - 8.3 10e3/uL    Absolute Lymphocytes 0.5 (L) 0.8 - 5.3 10e3/uL    Absolute Monocytes 0.7 0.0 - 1.3 10e3/uL    Absolute Eosinophils 0.0 0.0 - 0.7 10e3/uL    Absolute Basophils 0.0 0.0 - 0.2 10e3/uL    Absolute Immature Granulocytes 0.1 <=0.4 10e3/uL    Absolute NRBCs 0.0 10e3/uL   HCG QUALitative pregnancy (blood)     Status: Normal   Result Value Ref Range    hCG Serum Qualitative Negative Negative   iStat Gases (lactate) venous, POCT     Status: Abnormal   Result Value Ref Range    Lactic Acid POCT 2.4 (H) <=2.0 mmol/L    Bicarbonate Venous POCT 22 21 - 28 mmol/L    O2 Sat, Venous POCT 52 (L) 70 - 75 %    pCO2 Venous POCT 32 (L) 40 - 50 mm Hg    pH Venous POCT 7.43 7.32 - 7.43    pO2 Venous POCT 26 25 - 47 mm Hg   Glucose by meter     Status: Abnormal   Result Value Ref Range    GLUCOSE BY METER POCT 239 (H) 70 - 99 mg/dL   Lactic acid whole blood     Status: Normal   Result Value Ref Range    Lactic Acid 1.9 0.7 - 2.0 mmol/L   Procalcitonin     Status: Normal   Result Value Ref Range    Procalcitonin 0.10 <0.50 ng/mL   CBC with platelets     Status: Abnormal   Result Value Ref Range    WBC Count 10.6 4.0 - 11.0 10e3/uL    RBC Count 4.61 3.80 - 5.20 10e6/uL    Hemoglobin 11.9 11.7 - 15.7 g/dL    Hematocrit 37.0 35.0 - 47.0 %    MCV 80 78 - 100 fL    MCH 25.8 (L) 26.5 - 33.0 pg    MCHC 32.2 31.5 - 36.5 g/dL    RDW 13.3  10.0 - 15.0 %    Platelet Count 215 150 - 450 10e3/uL   Group A Streptococcus PCR Throat Swab     Status: Normal    Specimen: Throat; Swab   Result Value Ref Range    Group A strep by PCR Not Detected Not Detected    Narrative    The Xpert Xpress Strep A test, performed on the [employer], is a rapid, qualitative in vitro diagnostic test for the detection of Streptococcus pyogenes (Group A ß-hemolytic Streptococcus, Strep A) in throat swab specimens from patients with signs and symptoms of pharyngitis. The Xpert Xpress Strep A test can be used as an aid in the diagnosis of Group A Streptococcal pharyngitis. The assay is not intended to monitor treatment for Group A Streptococcus infections. The Xpert Xpress Strep A test utilizes an automated real-time polymerase chain reaction (PCR) to detect Streptococcus pyogenes DNA.   CBC + differential     Status: Abnormal    Narrative    The following orders were created for panel order CBC + differential.  Procedure                               Abnormality         Status                     ---------                               -----------         ------                     CBC with platelets and d...[[id number]]  Abnormal            Final result                 Please view results for these tests on the individual orders.

## 2024-06-09 NOTE — PHARMACY-ADMISSION MEDICATION HISTORY
Pharmacist Admission Medication History    Admission medication history is complete. The information provided in this note is only as accurate as the sources available at the time of the update.    Information Source(s): Patient, Hospital records, and CareEverywhere/SureScripts via in-person    Pertinent Information: Patient has NOT started glipizide yet, but stated she will begin this next week. She currently takes 0.25 mg of ozempic weekly, but will be increasing to 0.5 mg this week.     Changes made to PTA medication list:  Added: Drysol, triamcinolone cream, glipizide, taltz, and ozempic  Deleted: Vitamin D and prenatal vitamin  Changed: None    Allergies reviewed with patient and updates made in EHR: no    Medication History Completed By: Terrence Mcmahon [facility] 6/8/2024 9:51 PM    PTA Med List   Medication Sig Last Dose    acetaminophen (TYLENOL) 500 MG tablet Take 1-2 tablets (500-1,000 mg) by mouth every 6 hours as needed for mild pain 6/8/2024 at am    aluminum chloride (DRYSOL) 20 % external solution 2-3 times weekly as needed Past Week    glipiZIDE (GLUCOTROL XL) 2.5 MG 24 hr tablet Take 2.5 mg by mouth daily     ibuprofen (ADVIL/MOTRIN) 200 MG tablet Take 1 tablet (200 mg) by mouth every 4 hours as needed for pain 6/8/2024 at am    ixekizumab (TALTZ) 80 MG/ML SOAJ auto-injector Inject 80 mg Subcutaneous every 28 days Past Week    semaglutide (OZEMPIC) 2 MG/1.5ML SOPN pen Inject 0.25 mg Subcutaneous every 7 days 6/6/2024    triamcinolone (KENALOG) 0.1 % external cream Apply topically 2 times daily 6/8/2024

## 2024-06-09 NOTE — ED PROVIDER NOTES
"  Emergency Department Note      History of Present Illness     Chief Complaint  Fever    HPI  Genesis Brennan is a 38 year old female with history of type 2 diabetes who presents to the ED for evaluation of fever.  Patient went to the fair yesterday and in the evening developed body aches, chills, and malaise.  She has had intermittent headache during this time but no other specific symptoms.  She presents today noting that she had \"sepsis\" when she had strep throat several months ago.  She denies sore throat today.  She denies dysuria, frequency reviewed, cough, shortness of breath, abdominal pain, nausea, vomiting, or any other concerns.      Independent Historian  None    Review of External Notes  Reviewed 12/13/2023 hospitalization    Past Medical History   Medical History and Problem List  Anemia  Hyperlipidemia  Hypothyroidism  Psoriasis  Type 2 diabetes   Vitamin D deficiency  Sepsis    Medications  Prenatal multivitamin  Aspirin 81 mg     Surgical History   Left breast lumpectomy     Physical Exam   Patient Vitals for the past 24 hrs:   BP Temp Temp src Pulse Resp SpO2 Height Weight   06/09/24 0206 (!) 138/90 98.4  F (36.9  C) Oral 109 18 96 % -- --   06/09/24 0100 (!) 139/91 -- -- 114 -- 96 % -- --   06/09/24 0030 (!) 141/92 -- -- 113 -- 97 % -- --   06/09/24 0004 -- 98.7  F (37.1  C) Oral 116 -- 95 % -- --   06/08/24 2350 -- -- -- (!) 123 -- -- -- --   06/08/24 2345 139/85 -- -- 120 -- -- -- --   06/08/24 2340 -- -- -- (!) 121 -- -- -- --   06/08/24 2335 -- -- -- 120 -- -- -- --   06/08/24 2330 134/88 -- -- 117 -- -- -- --   06/08/24 2325 -- -- -- 120 -- -- -- --   06/08/24 2320 -- -- -- 120 -- -- -- --   06/08/24 2315 136/85 -- -- 120 -- -- -- --   06/08/24 2310 -- -- -- (!) 122 -- -- -- --   06/08/24 2305 -- -- -- (!) 123 -- -- -- --   06/08/24 2300 136/86 -- -- (!) 121 -- -- -- --   06/08/24 2240 -- -- -- (!) 123 -- 98 % -- --   06/08/24 2235 -- -- -- (!) 124 -- 97 % -- --   06/08/24 2230 (!) 144/84 -- " "-- 120 -- 96 % -- --   06/08/24 2225 -- -- -- 120 -- 95 % -- --   06/08/24 2220 128/89 -- -- (!) 123 -- 100 % -- --   06/08/24 2215 126/86 -- -- (!) 123 20 98 % -- --   06/08/24 2200 (!) 140/75 -- -- (!) 125 18 98 % -- --   06/08/24 2145 130/85 -- -- (!) 130 18 96 % -- --   06/08/24 2130 (!) 141/91 -- -- (!) 128 20 100 % -- --   06/08/24 2105 (!) 151/94 100.3  F (37.9  C) Oral 120 20 -- -- --   06/08/24 2014 (!) 163/104 -- -- (!) 125 -- 96 % -- --   06/08/24 1916 (!) 187/110 (!) 102.7  F (39.3  C) Temporal (!) 130 18 98 % 1.727 m (5' 8\") 85.7 kg (188 lb 15 oz)     Physical Exam  Constitutional: Alert, attentive  HENT:    Nose: Nose normal.    Mouth/Throat: Oropharynx is clear, mucous membranes are moist   Eyes: EOM are normal.   CV: Tachycardic, regular; no murmurs, rubs or gallups  Chest: Effort normal and breath sounds normal.   GI:  There is no tenderness. No distension. Normal bowel sounds  MSK: Normal range of motion.   Neurological: Alert, attentive; no meningismus  Skin: Skin is warm and dry. No rash     Diagnostics   Lab Results   Labs Ordered and Resulted from Time of ED Arrival to Time of ED Departure   COMPREHENSIVE METABOLIC PANEL - Abnormal       Result Value    Sodium 134 (*)     Potassium 3.9      Carbon Dioxide (CO2) 20 (*)     Anion Gap 15      Urea Nitrogen 8.9      Creatinine 0.63      GFR Estimate >90      Calcium 9.6      Chloride 99      Glucose 225 (*)     Alkaline Phosphatase 87      AST 34      ALT 44      Protein Total 8.1      Albumin 4.5      Bilirubin Total 0.8     ROUTINE UA WITH MICROSCOPIC - Abnormal    Color Urine Light Yellow      Appearance Urine Clear      Glucose Urine >=1000 (*)     Bilirubin Urine Negative      Ketones Urine Trace (*)     Specific Gravity Urine 1.015      Blood Urine Negative      pH Urine 7.5 (*)     Protein Albumin Urine Negative      Urobilinogen Urine 2.0      Nitrite Urine Negative      Leukocyte Esterase Urine Negative      RBC Urine <1      WBC Urine 1   "    Squamous Epithelials Urine 2 (*)    CBC WITH PLATELETS AND DIFFERENTIAL - Abnormal    WBC Count 11.7 (*)     RBC Count 4.92      Hemoglobin 12.5      Hematocrit 37.8      MCV 77 (*)     MCH 25.4 (*)     MCHC 33.1      RDW 13.2      Platelet Count 249      % Neutrophils 89      % Lymphocytes 4      % Monocytes 6      % Eosinophils 0      % Basophils 0      % Immature Granulocytes 1      NRBCs per 100 WBC 0      Absolute Neutrophils 10.4 (*)     Absolute Lymphocytes 0.5 (*)     Absolute Monocytes 0.7      Absolute Eosinophils 0.0      Absolute Basophils 0.0      Absolute Immature Granulocytes 0.1      Absolute NRBCs 0.0     ISTAT GASES LACTATE VENOUS POCT - Abnormal    Lactic Acid POCT 2.4 (*)     Bicarbonate Venous POCT 22      O2 Sat, Venous POCT 52 (*)     pCO2 Venous POCT 32 (*)     pH Venous POCT 7.43      pO2 Venous POCT 26     GLUCOSE BY METER - Abnormal    GLUCOSE BY METER POCT 239 (*)    INFLUENZA A/B, RSV, & SARS-COV2 PCR - Normal    Influenza A PCR Negative      Influenza B PCR Negative      RSV PCR Negative      SARS CoV2 PCR Negative     KETONE BETA-HYDROXYBUTYRATE QUANTITATIVE, RAPID - Normal    Ketone (Beta-Hydroxybutyrate) Quantitative <0.18     HCG QUALITATIVE PREGNANCY - Normal    hCG Serum Qualitative Negative     LACTIC ACID WHOLE BLOOD - Normal    Lactic Acid 1.9     PROCALCITONIN - Normal    Procalcitonin 0.10     GROUP A STREPTOCOCCUS PCR THROAT SWAB - Normal    Group A strep by PCR Not Detected     BLOOD CULTURE   BLOOD CULTURE   RESPIRATORY PANEL PCR       Imaging  CT Head w/o Contrast   Final Result   IMPRESSION:   1.  Normal head CT.      XR Chest 2 Views   Final Result   IMPRESSION: Negative chest.        Report per radiology    EKG   ECG results from 09/04/23   EKG 12 lead     Value    Systolic Blood Pressure     Diastolic Blood Pressure     Ventricular Rate 81    Atrial Rate 81    PA Interval 170    QRS Duration 72        QTc 413    P Axis 44    R AXIS 46    T Axis 29     Interpretation ECG      Sinus rhythm  Normal ECG  When compared with ECG of 29-MAR-2017 23:50,  No significant change was found       Independent Interpretation  No infiltrate on chest x-ray  ED Course    Medications Administered  NS 2 liters  Ibuprofen 600 mg  APAP 1 g  Ceftriaxone 1 g IV  Benadryl 25 mg IV  Reglan 10 mg IV    Procedures  Procedures     Discussion of Management  Admitting Hospitalist, Dr Navarro        ED Course  ED Course as of 06/09/24 0251   Sat Jun 08, 2024 1958 I obtained history and examined the patient as noted above.      Medical Decision Making / Diagnosis   CMS Diagnoses: The patient has signs of Severe Sepsis        If one the following conditions is present, a 30 mL/kg bolus is recommended as part of the 6 hour bundle (IBW can be used for BMI >30, or document refusal/contraindication):      1.   Initial hypotension  defined as 2 bps < 90 or map < 65 in the 6hrs before or 3hrs after time zero.     2.  Lactate >4.      The patient has signs of Severe Sepsis as evidenced by:    1. 2 SIRS criteria, AND  2. Suspected infection, AND   3. Organ dysfunction: Lactic Acidosis with value >2.0    Time severe sepsis diagnosis confirmed: 2.7  06/09/24 as this was the time when Lactate resulted, and the level was > 2.0    3 Hour Severe Sepsis Bundle Completion:    1. Initial Lactic Acid Result:   Recent Labs   Lab Test 06/08/24 2133 06/08/24 2022    LACT 1.9 2.4*      2. Blood Cultures before Antibiotics: Yes  3. Broad Spectrum Antibiotics Administered:  yes       Anti-infectives (From admission through now)      Start     Dose/Rate Route Frequency Ordered Stop    06/08/24 2125  cefTRIAXone (ROCEPHIN) 1 g vial to attach to  mL bag for ADULTS or NS 50 mL bag for PEDS         1 g  over 15-30 Minutes Intravenous ONCE 06/08/24 2121 06/08/24 2220            4. Is initial hypotension present?     No (IV fluid bolus NOT required). IV Fluid volume administered: 2 L                    Severe Sepsis  reassessment:  1. Repeat Lactic Acid Level within 6 hours of time zero: 1.9    MDM  This is a 38-year-old female presents for evaluation of sepsis syndrome with unidentified source.  She describes influenza-like illness but has negative viral testing, negative chest x-ray, unremarkable exam without rash, no concerning abdominal symptoms suggest intra-abdominal infection or process, negative strep test, etc.  On recheck, lactic is improving and broad-spectrum antibiotics were administered after blood cultures were drawn.  She had mild to moderate headache but this is resolved this time.  I emphasized the importance of appropriate infectious workup but she continues to decline lumbar puncture.  She demonstrates decision-making capacity and understanding that this could cause us to miss potentially fatal meningitis, although this does appear to be less likely given low probability calcitonin.  Given unclear source and given ongoing tachycardia, patient will be admitted to observation unit for supportive cares, blood culture monitoring, and IV antibiotics.    Disposition  The patient was admitted to the hospital.     ICD-10 Codes:    ICD-10-CM    1. Influenza-like illness  J11.1       2. Tachycardia  R00.0            Discharge Medications  Current Discharge Medication List        Scribe Disclosure:  I, Rose Marie Tiwari, am serving as a scribe at 7:59 PM on 6/8/2024 to document services personally performed by Lasha Pepper MD based on my observations and the provider's statements to me.      Lasha Pepper MD  06/09/24 0254

## 2024-06-09 NOTE — ED NOTES
[facility]  ED Nurse Handoff Report    ED Chief complaint: Fever  . ED Diagnosis:   Final diagnoses:   Influenza-like illness   Tachycardia       Allergies:   Allergies   Allergen Reactions    Metformin     Otezla [Apremilast]        Code Status: Full Code    Activity level - Baseline/Home:  independent.  Activity Level - Current:   independent.   Lift room needed: No.   Bariatric: No   Needed: No   Isolation: No.   Infection: Not Applicable.     Respiratory status: Room air    Vital Signs (within 30 minutes):   Vitals:    06/08/24 2345 06/08/24 2350 06/09/24 0004 06/09/24 0100   BP: 139/85   (!) 139/91   Pulse: 120 (!) 123 116 114   Resp:       Temp:   98.7  F (37.1  C)    TempSrc:   Oral    SpO2:   95% 96%   Weight:       Height:           Cardiac Rhythm:  ,   Cardiac  Cardiac Rhythm: Sinus tachycardia  Pain level:    Patient confused: No.   Patient Falls Risk: activity supervised.   Elimination Status: Has voided     Patient Report - Initial Complaint: Fever and generalized body ache.   Focused Assessment:   Genesis Brennan is a 38 year old female with history of type 2 diabetes who presents to the ED for evaluation of fever. Pt is ill appearing      Abnormal Results:   Labs Ordered and Resulted from Time of ED Arrival to Time of ED Departure   COMPREHENSIVE METABOLIC PANEL - Abnormal       Result Value    Sodium 134 (*)     Potassium 3.9      Carbon Dioxide (CO2) 20 (*)     Anion Gap 15      Urea Nitrogen 8.9      Creatinine 0.63      GFR Estimate >90      Calcium 9.6      Chloride 99      Glucose 225 (*)     Alkaline Phosphatase 87      AST 34      ALT 44      Protein Total 8.1      Albumin 4.5      Bilirubin Total 0.8     ROUTINE UA WITH MICROSCOPIC - Abnormal    Color Urine Light Yellow      Appearance Urine Clear      Glucose Urine >=1000 (*)     Bilirubin Urine Negative      Ketones Urine Trace (*)     Specific Gravity Urine 1.015      Blood Urine Negative      pH Urine 7.5  (*)     Protein Albumin Urine Negative      Urobilinogen Urine 2.0      Nitrite Urine Negative      Leukocyte Esterase Urine Negative      RBC Urine <1      WBC Urine 1      Squamous Epithelials Urine 2 (*)    CBC WITH PLATELETS AND DIFFERENTIAL - Abnormal    WBC Count 11.7 (*)     RBC Count 4.92      Hemoglobin 12.5      Hematocrit 37.8      MCV 77 (*)     MCH 25.4 (*)     MCHC 33.1      RDW 13.2      Platelet Count 249      % Neutrophils 89      % Lymphocytes 4      % Monocytes 6      % Eosinophils 0      % Basophils 0      % Immature Granulocytes 1      NRBCs per 100 WBC 0      Absolute Neutrophils 10.4 (*)     Absolute Lymphocytes 0.5 (*)     Absolute Monocytes 0.7      Absolute Eosinophils 0.0      Absolute Basophils 0.0      Absolute Immature Granulocytes 0.1      Absolute NRBCs 0.0     ISTAT GASES LACTATE VENOUS POCT - Abnormal    Lactic Acid POCT 2.4 (*)     Bicarbonate Venous POCT 22      O2 Sat, Venous POCT 52 (*)     pCO2 Venous POCT 32 (*)     pH Venous POCT 7.43      pO2 Venous POCT 26     GLUCOSE BY METER - Abnormal    GLUCOSE BY METER POCT 239 (*)    INFLUENZA A/B, RSV, & SARS-COV2 PCR - Normal    Influenza A PCR Negative      Influenza B PCR Negative      RSV PCR Negative      SARS CoV2 PCR Negative     KETONE BETA-HYDROXYBUTYRATE QUANTITATIVE, RAPID - Normal    Ketone (Beta-Hydroxybutyrate) Quantitative <0.18     HCG QUALITATIVE PREGNANCY - Normal    hCG Serum Qualitative Negative     LACTIC ACID WHOLE BLOOD - Normal    Lactic Acid 1.9     PROCALCITONIN - Normal    Procalcitonin 0.10     GROUP A STREPTOCOCCUS PCR THROAT SWAB - Normal    Group A strep by PCR Not Detected     BLOOD CULTURE   BLOOD CULTURE        CT Head w/o Contrast   Final Result   IMPRESSION:   1.  Normal head CT.      XR Chest 2 Views   Final Result   IMPRESSION: Negative chest.          Treatments provided: See MAR  Family Comments: NA   OBS brochure/video discussed/provided to patient:  No  ED Medications:   Medications    ondansetron (ZOFRAN) injection 4 mg (4 mg Intravenous $Given 6/8/24 2201)   ibuprofen (ADVIL/MOTRIN) tablet 600 mg (600 mg Oral $Given 6/8/24 1921)   acetaminophen (TYLENOL) tablet 1,000 mg (1,000 mg Oral $Given 6/8/24 2012)   sodium chloride 0.9% BOLUS 1,000 mL (0 mLs Intravenous Stopped 6/8/24 2135)   sodium chloride 0.9% BOLUS 1,000 mL (0 mLs Intravenous Stopped 6/8/24 2220)   cefTRIAXone (ROCEPHIN) 1 g vial to attach to  mL bag for ADULTS or NS 50 mL bag for PEDS (0 g Intravenous Stopped 6/8/24 2220)   diphenhydrAMINE (BENADRYL) injection 25 mg (25 mg Intravenous $Given 6/8/24 2231)   metoclopramide (REGLAN) injection 10 mg (10 mg Intravenous $Given 6/8/24 2231)   sodium chloride 0.9% BOLUS 1,000 mL (1,000 mLs Intravenous $New Bag 6/9/24 0002)       Drips infusing:  No  For the majority of the shift this patient was Green.   Interventions performed were NA, corrected lactic     Sepsis treatment initiated: No    Cares/treatment/interventions/medications to be completed following ED care: NA     ED Nurse Name: Imani Monge RN  1:12 AM  RECEIVING UNIT ED HANDOFF REVIEW    Above ED Nurse Handoff Report was reviewed: Yes  Reviewed by: Linda Britt RN on June 9, 2024 at 1:39 AM   I Vocera called the ED to inform them the note was read: Yes

## 2024-06-09 NOTE — PLAN OF CARE
"Goal Outcome Evaluation:      Plan of Care Reviewed With: patient    Overall Patient Progress: improvingOverall Patient Progress: improving    Outcome Evaluation: Pt feeling better, mild headache    Pt is alert and orientated x4 pleasant. Independent in room.  Regular diet being tolerated. PIV S/L c/o slight headache, tylenol given  for temp 100.4  Pt sleeping between cares.     Problem: Adult Inpatient Plan of Care  Goal: Plan of Care Review  Description: The Plan of Care Review/Shift note should be completed every shift.  The Outcome Evaluation is a brief statement about your assessment that the patient is improving, declining, or no change.  This information will be displayed automatically on your shift  note.  Outcome: Progressing  Flowsheets (Taken 6/9/2024 1137)  Outcome Evaluation: Pt feeling better, mild headache  Plan of Care Reviewed With: patient  Overall Patient Progress: improving  Goal: Patient-Specific Goal (Individualized)  Description: You can add care plan individualizations to a care plan. Examples of Individualization might be:  \"Parent requests to be called daily at 9am for status\", \"I have a hard time hearing out of my right ear\", or \"Do not touch me to wake me up as it startles  me\".  Outcome: Progressing  Goal: Absence of Hospital-Acquired Illness or Injury  Outcome: Progressing  Intervention: Identify and Manage Fall Risk  Recent Flowsheet Documentation  Taken 6/9/2024 0800 by Rodrigo García, RN  Safety Promotion/Fall Prevention: safety round/check completed  Intervention: Prevent Skin Injury  Recent Flowsheet Documentation  Taken 6/9/2024 0800 by Rodrigo García, RN  Body Position: position changed independently  Intervention: Prevent and Manage VTE (Venous Thromboembolism) Risk  Recent Flowsheet Documentation  Taken 6/9/2024 0800 by Rodrigo García, RN  VTE Prevention/Management: SCDs (sequential compression devices) off  Goal: Optimal Comfort and Wellbeing  Outcome: " Progressing  Intervention: Monitor Pain and Promote Comfort  Recent Flowsheet Documentation  Taken 6/9/2024 0800 by Rodrigo García, RN  Pain Management Interventions: medication (see MAR)  Goal: Readiness for Transition of Care  Outcome: Progressing     Problem: Fever  Goal: Body Temperature in Desired Range  Outcome: Progressing  Intervention: Promote Normothermia  Recent Flowsheet Documentation  Taken 6/9/2024 0800 by Rodrigo García, RN  Fever Reduction/Comfort Measures: fluid intake increased

## 2024-06-09 NOTE — PLAN OF CARE
"Goal Outcome Evaluation:      Plan of Care Reviewed With: patient    Overall Patient Progress: improvingOverall Patient Progress: improving    Outcome Evaluation: Patient is A&O X4, c/o mild headache declined intervantion, Patient is afrible  \"I'm feeling much better\".      Problem: Adult Inpatient Plan of Care  Goal: Plan of Care Review  Description:   6/9/2024 0232 by Linda Britt RN  Outcome: Progressing  Flowsheets (Taken 6/9/2024 0232)  Outcome Evaluation: Patient is A&O X4, c/o mild headache declined intervantion, Patient is afrible  \"I'm feeling much better\".  6/9/2024 0231 by Linda Britt RN  Outcome: Progressing  Flowsheets (Taken 6/9/2024 0231)  Plan of Care Reviewed With: patient  Overall Patient Progress: improving  Goal: Patient-Specific Goal (Individualized)  Description: You can add care plan individualizations to a care plan. Examples of Individualization might be:  \"Parent requests to be called daily at 9am for status\", \"I have a hard time hearing out of my right ear\", or \"Do not touch me to wake me up as it startles  me\".  6/9/2024 0232 by Linda Britt RN  Outcome: Progressing  6/9/2024 0231 by Linda Britt RN  Outcome: Progressing  Goal: Absence of Hospital-Acquired Illness or Injury  6/9/2024 0232 by Linda Britt RN  Outcome: Progressing  6/9/2024 0231 by Linda Britt RN  Outcome: Progressing  Intervention: Prevent Skin Injury  Recent Flowsheet Documentation  Taken 6/9/2024 0206 by Linda Britt RN  Body Position: position changed independently  Intervention: Prevent and Manage VTE (Venous Thromboembolism) Risk  Recent Flowsheet Documentation  Taken 6/9/2024 0206 by Linda Britt RN  VTE Prevention/Management: SCDs (sequential compression devices) off  Intervention: Prevent Infection  Recent Flowsheet Documentation  Taken 6/9/2024 0206 by Linda Britt RN  Infection Prevention: cohorting utilized  Goal: Optimal Comfort and Wellbeing  6/9/2024 0232 by Linda Britt" RN  Outcome: Progressing  6/9/2024 0231 by Linda Britt RN  Outcome: Progressing  Intervention: Monitor Pain and Promote Comfort  Recent Flowsheet Documentation  Taken 6/9/2024 0206 by Linda Britt RN  Pain Management Interventions: declines  Goal: Readiness for Transition of Care  6/9/2024 0232 by Linda Britt RN  Outcome: Progressing  6/9/2024 0231 by Linda Britt RN  Outcome: Progressing     Problem: Fever  Goal: Body Temperature in Desired Range  Outcome: Progressing  Intervention: Promote Normothermia  Flowsheets (Taken 6/9/2024 0232)  Fever Reduction/Comfort Measures: fluid intake increased

## 2024-06-09 NOTE — PLAN OF CARE
ROOM # 202-2    Living Situation (if not independent, order SW consult):  Facility name: From home w/ and kids  : Dallas Simmons (Spouse)  [phone]     Activity level at baseline: Independent  Activity level on admit: Independent    Who will be transporting you at discharge: Dallas Simmons(Spouse)   [phone]     Patient registered to observation; given Patient Bill of Rights; given the opportunity to ask questions about observation status and their plan of care.  Patient has been oriented to the observation room, bathroom and call light is in place.    Discussed discharge goals and expectations with patient/family.

## 2024-06-09 NOTE — PROGRESS NOTES
[facility]    Medicine Progress Note - Hospitalist Service       Date of Admission:  6/8/2024    Assessment & Plan          Summary of Stay: Genesis Brennan is a 38 year old female with a history of  T2dm, psoriasis on  ixekizumab admitted on 6/8/2024 with fevers, progressive body aches, and a headache     She went to [facility] on 6/7 and then later that evening noticed the onset of generalized malaise and body aches. She had a mild sore throat as well and that's already resolved. On the day of presentation had tactile fevers.  She noticed no appetite, so mild nausea without vomiting, and no vomiting.       Headache started on the night of presentation and is just the top of her head, mostly throbbing in nature, and noted to occur after arriving in the ER and has responded to apap     Denies nc/ea/st, no cough/sob, no dysuria/urgency/frequency, no joint pains, no neck stiffness/neck pain.      ER T 102.7 with associated tachycardia into the 130's, BPs and respiratory status ok   CMP with CO@ 20 nl AG @ 15,  CBC with mild leukocytosis 11.7 with lymphopenia and neutrophilia.  LA 2.4 , procal 0.1  Covid/flu/rsv negative  Rapid strep negative   UA trace ketones, glucose, no inflammatory findings  CXR negative  NCHCT negative     She was given 3 L of IVF, empiric ceftriaxone      I am asked to admit for fever/sepsis without source identified     Plan for the day 6/9/2024  - Continues to refuse LP, headache improving to 2/10 and improving generalized body aches  - Blood cultures pending    Problem List:   Fever, generalized malaise  Sepsis fever/tachy/leukocytosis/elevated LA  Considered immunocompromised in the setting of being on ixekizumab  Sx certainly suggest a viral process although in one who is compromised she may not be manifesting appropriate features. Lymphopenia would also be suggestive of viral process, although also with neutrophilia.   She doesn't have any clear localizing sx  to give us a clue.  She was encouraged by Dr Pepper and me to have an LP but she refused for now but will think about it.   -LA normalized after 3 L IVF   -will hold on additional abx as I'm not sure what I'm treating   - CXR and CT head non acute, pct negative. blood cultures pending      T2dm  Chronically on semaglutide   -takes weekly and has already taken this week  -BG cks bid and notify MD if > 300      Psoriasos   Chronically on ixekizumab-takes monthly     DVT Prophylaxis: Pneumatic Compression Devices  Code Status: Full Code  Functional Status: independent  [place]: not needed  Access:   PIV          Disposition Plan      Expected Discharge Date: 06/10/2024               The patient's care was discussed with the Bedside Nurse, Care Coordinator/, and Patient.    Geneva Bar DNP, NP-C  Hospitalist Service  [facility]  Securely message with the Vocera Web Console (learn more here)  Text page via [employer] Paging/Directory   ______________________________________________________________________    Interval History   Fever up to 100.4 F this morning, improving generalized body aches, mild headache of 2/10.  Continues to refuse LP.  Denies recent camping or being on the woods.     Data reviewed today: I reviewed all medications, new labs and imaging results over the last 24 hours.     Physical Exam   Vital Signs: Temp: 100.4  F (38  C) Temp src: Oral BP: (!) 150/103 Pulse: 115   Resp: 16 SpO2: 98 % O2 Device: None (Room air)    GENERAL: Patient is in fair condition, in no apparent distress.  HEENT: Patient is normocephalic, atraumatic.  EYES: Anicteric without injection.  RESPIRATORY: Clear to auscultation bilaterally.  CARDIOVASCULAR: Tachycardia.  Normal S1, S2 - no m/g/r.  GASTROINTESTINAL: The abdomen was normal in contour. Bowel sounds were present. Soft, non-tender without distension.  EXTREMITIES: Warm & well perfused. No edema  NEUROLOGIC: The patient was alert and  conversation was appropriate. Grossly non-focal.  PSYCHIATRIC: Mood and affect congruent.  SKIN: Exposed skin is within normal limits.      Data   Recent Labs   Lab 06/09/24  0509 06/09/24  0508 06/08/24 2021 06/08/24 2012   WBC 10.6  --   --  11.7*   HGB 11.9  --   --  12.5   MCV 80  --   --  77*     --   --  249   NA  --  136  --  134*   POTASSIUM  --  4.0  --  3.9   CHLORIDE  --  105  --  99   CO2  --  18*  --  20*   BUN  --  4.9*  --  8.9   CR  --  0.50*  --  0.63   ANIONGAP  --  13  --  15   MARILU  --  8.4*  --  9.6   GLC  --  214* 239* 225*   ALBUMIN  --   --   --  4.5   PROTTOTAL  --   --   --  8.1   BILITOTAL  --   --   --  0.8   ALKPHOS  --   --   --  87   ALT  --   --   --  44   AST  --   --   --  34       Imaging results reviewed over the past 24 hrs:   Recent Results (from the past 24 hour(s))   XR Chest 2 Views    Narrative    EXAM: XR CHEST 2 VIEWS  LOCATION: [facility]  DATE: 6/8/2024    INDICATION: fever, sepsis  COMPARISON: Chest film 3/29/2017      Impression    IMPRESSION: Negative chest.   CT Head w/o Contrast    Narrative    EXAM: CT HEAD W/O CONTRAST  LOCATION: [facility]  DATE: 6/8/2024    INDICATION: headache  COMPARISON: None.  TECHNIQUE: Routine CT Head without IV contrast. Multiplanar reformats. Dose reduction techniques were used.    FINDINGS:  INTRACRANIAL CONTENTS: No intracranial hemorrhage, extraaxial collection, or mass effect.  No CT evidence of acute infarct. Normal parenchymal attenuation. Normal ventricles and sulci.     VISUALIZED ORBITS/SINUSES/MASTOIDS: No intraorbital abnormality. No paranasal sinus mucosal disease. No middle ear or mastoid effusion.    BONES/SOFT TISSUES: No acute abnormality.      Impression    IMPRESSION:  1.  Normal head CT.     Medications   Current Facility-Administered Medications   Medication Dose Route Frequency Provider Last Rate Last Admin     Current Facility-Administered Medications    Medication Dose Route Frequency Provider Last Rate Last Admin    polyethylene glycol (MIRALAX) Packet 17 g  17 g Oral Daily Pamela Navarro MD   17 g at 06/09/24 0838

## 2024-06-09 NOTE — PLAN OF CARE
"Goal Outcome Evaluation:      Plan of Care Reviewed With: patient    Overall Patient Progress: improvingOverall Patient Progress: improving    Outcome Evaluation: Pt feeling better, mild headache    Pt is alert and orientated x4 pleasant. Independent in room.  Regular diet being tolerated. PIV S/L c/o slight headache, tylenol given  for temp 100.4      Problem: Adult Inpatient Plan of Care  Goal: Plan of Care Review  Description: The Plan of Care Review/Shift note should be completed every shift.  The Outcome Evaluation is a brief statement about your assessment that the patient is improving, declining, or no change.  This information will be displayed automatically on your shift  note.  Outcome: Progressing  Flowsheets (Taken 6/9/2024 1137)  Outcome Evaluation: Pt feeling better, mild headache  Plan of Care Reviewed With: patient  Overall Patient Progress: improving  Goal: Patient-Specific Goal (Individualized)  Description: You can add care plan individualizations to a care plan. Examples of Individualization might be:  \"Parent requests to be called daily at 9am for status\", \"I have a hard time hearing out of my right ear\", or \"Do not touch me to wake me up as it startles  me\".  Outcome: Progressing  Goal: Absence of Hospital-Acquired Illness or Injury  Outcome: Progressing  Intervention: Identify and Manage Fall Risk  Recent Flowsheet Documentation  Taken 6/9/2024 0800 by Rodrigo García, RN  Safety Promotion/Fall Prevention: safety round/check completed  Intervention: Prevent Skin Injury  Recent Flowsheet Documentation  Taken 6/9/2024 0800 by Rodrigo García, RN  Body Position: position changed independently  Intervention: Prevent and Manage VTE (Venous Thromboembolism) Risk  Recent Flowsheet Documentation  Taken 6/9/2024 0800 by Rodrigo García, RN  VTE Prevention/Management: SCDs (sequential compression devices) off  Goal: Optimal Comfort and Wellbeing  Outcome: Progressing  Intervention: Monitor Pain and " Promote Comfort  Recent Flowsheet Documentation  Taken 6/9/2024 0800 by Rodrigo García, RN  Pain Management Interventions: medication (see MAR)  Goal: Readiness for Transition of Care  Outcome: Progressing     Problem: Fever  Goal: Body Temperature in Desired Range  Outcome: Progressing  Intervention: Promote Normothermia  Recent Flowsheet Documentation  Taken 6/9/2024 0800 by Rodrigo García, RN  Fever Reduction/Comfort Measures: fluid intake increased

## 2024-06-10 VITALS
WEIGHT: 188.93 LBS | OXYGEN SATURATION: 96 % | SYSTOLIC BLOOD PRESSURE: 134 MMHG | BODY MASS INDEX: 28.63 KG/M2 | HEIGHT: 68 IN | DIASTOLIC BLOOD PRESSURE: 88 MMHG | RESPIRATION RATE: 18 BRPM | TEMPERATURE: 98.2 F | HEART RATE: 73 BPM

## 2024-06-10 LAB
ANION GAP SERPL CALCULATED.3IONS-SCNC: 14 MMOL/L (ref 7–15)
BACTERIA UR CULT: NO GROWTH
BASOPHILS # BLD AUTO: 0 10E3/UL (ref 0–0.2)
BASOPHILS NFR BLD AUTO: 0 %
BUN SERPL-MCNC: 7.4 MG/DL (ref 6–20)
CALCIUM SERPL-MCNC: 8.7 MG/DL (ref 8.6–10)
CHLORIDE SERPL-SCNC: 102 MMOL/L (ref 98–107)
CREAT SERPL-MCNC: 0.49 MG/DL (ref 0.51–0.95)
DEPRECATED HCO3 PLAS-SCNC: 20 MMOL/L (ref 22–29)
EGFRCR SERPLBLD CKD-EPI 2021: >90 ML/MIN/1.73M2
EOSINOPHIL # BLD AUTO: 0.2 10E3/UL (ref 0–0.7)
EOSINOPHIL NFR BLD AUTO: 5 %
ERYTHROCYTE [DISTWIDTH] IN BLOOD BY AUTOMATED COUNT: 13.2 % (ref 10–15)
GLUCOSE BLDC GLUCOMTR-MCNC: 156 MG/DL (ref 70–99)
GLUCOSE SERPL-MCNC: 174 MG/DL (ref 70–99)
HCT VFR BLD AUTO: 37.2 % (ref 35–47)
HGB BLD-MCNC: 12.3 G/DL (ref 11.7–15.7)
IMM GRANULOCYTES # BLD: 0 10E3/UL
IMM GRANULOCYTES NFR BLD: 0 %
LYMPHOCYTES # BLD AUTO: 1.3 10E3/UL (ref 0.8–5.3)
LYMPHOCYTES NFR BLD AUTO: 29 %
MCH RBC QN AUTO: 25.6 PG (ref 26.5–33)
MCHC RBC AUTO-ENTMCNC: 33.1 G/DL (ref 31.5–36.5)
MCV RBC AUTO: 77 FL (ref 78–100)
MONOCYTES # BLD AUTO: 0.4 10E3/UL (ref 0–1.3)
MONOCYTES NFR BLD AUTO: 9 %
NEUTROPHILS # BLD AUTO: 2.5 10E3/UL (ref 1.6–8.3)
NEUTROPHILS NFR BLD AUTO: 57 %
NRBC # BLD AUTO: 0 10E3/UL
NRBC BLD AUTO-RTO: 0 /100
PLATELET # BLD AUTO: 237 10E3/UL (ref 150–450)
POTASSIUM SERPL-SCNC: 4 MMOL/L (ref 3.4–5.3)
RBC # BLD AUTO: 4.81 10E6/UL (ref 3.8–5.2)
SODIUM SERPL-SCNC: 136 MMOL/L (ref 135–145)
WBC # BLD AUTO: 4.5 10E3/UL (ref 4–11)

## 2024-06-10 PROCEDURE — G0378 HOSPITAL OBSERVATION PER HR: HCPCS

## 2024-06-10 PROCEDURE — 80048 BASIC METABOLIC PNL TOTAL CA: CPT

## 2024-06-10 PROCEDURE — 250N000013 HC RX MED GY IP 250 OP 250 PS 637: Performed by: NURSE PRACTITIONER

## 2024-06-10 PROCEDURE — 99238 HOSP IP/OBS DSCHRG MGMT 30/<: CPT

## 2024-06-10 PROCEDURE — 85004 AUTOMATED DIFF WBC COUNT: CPT

## 2024-06-10 PROCEDURE — 36415 COLL VENOUS BLD VENIPUNCTURE: CPT

## 2024-06-10 PROCEDURE — 82962 GLUCOSE BLOOD TEST: CPT

## 2024-06-10 PROCEDURE — 250N000013 HC RX MED GY IP 250 OP 250 PS 637: Performed by: INTERNAL MEDICINE

## 2024-06-10 RX ADMIN — POLYETHYLENE GLYCOL 3350 17 G: 17 POWDER, FOR SOLUTION ORAL at 08:32

## 2024-06-10 RX ADMIN — ACETAMINOPHEN 650 MG: 325 TABLET, FILM COATED ORAL at 00:04

## 2024-06-10 RX ADMIN — GLIPIZIDE 2.5 MG: 2.5 TABLET, EXTENDED RELEASE ORAL at 08:32

## 2024-06-10 ASSESSMENT — ACTIVITIES OF DAILY LIVING (ADL)
ADLS_ACUITY_SCORE: 33

## 2024-06-10 NOTE — PLAN OF CARE
PRIMARY DIAGNOSIS: fever/malaise  OUTPATIENT/OBSERVATION GOALS TO BE MET BEFORE DISCHARGE:  ADLs back to baseline: Yes    Activity and level of assistance: Ambulating independently.    Pain status: Improved-controlled with oral pain medications.    Return to near baseline physical activity: Yes     Discharge Planner Nurse   Safe discharge environment identified: Yes  Barriers to discharge: Yes       Entered by: Luh Shetty RN 06/09/2024 10:32 PM  Pt AO x4. VSS on RA, LS clear, BS active. Pt up independently. PIV SL. Tolerating regular diet. Pt noted H/A 4/10 pain, acetaminophen given which was effective. Will continue to monitor and provide supportive cares.   Please review provider order for any additional goals.   Nurse to notify provider when observation goals have been met and patient is ready for discharge.    Goal Outcome Evaluation:      Plan of Care Reviewed With: patient    Overall Patient Progress: improvingOverall Patient Progress: improving    Outcome Evaluation: Pt noted feeling better mild headache noted, feeling less tired.

## 2024-06-10 NOTE — DISCHARGE SUMMARY
"[facility]  Hospitalist Discharge Summary      Date of Admission:  6/8/2024  Date of Discharge:  6/10/2024  Discharging Provider: NIRAJ Rodriguez PA-C  Discharge Service: Hospitalist Service    Discharge Diagnoses   Fever  Generalized malaise  Sepsis unknown etiology    Clinically Significant Risk Factors     # DMII: A1C = 9.0 % (Ref range: <5.7 %) within past 6 months    # Overweight: Estimated body mass index is 28.73 kg/m  as calculated from the following:    Height as of this encounter: 1.727 m (5' 8\").    Weight as of this encounter: 85.7 kg (188 lb 15 oz).       Follow-ups Needed After Discharge   Follow up with primary care provider, Physician No Ref-Primary, within   7-14 days for hospital follow- up.  No follow up labs or test are needed.         Unresulted Labs Ordered in the Past 30 Days of this Admission       Date and Time Order Name Status Description    6/8/2024  8:03 PM Blood Culture Peripheral Blood Preliminary NGTD    6/8/2024  8:03 PM Blood Culture Arm, Right Preliminary NGTD        These results will be followed up by hospitalist    Discharge Disposition   Discharged to home  Condition at discharge: Stable    Hospital Course   Genesis Brennan is a 38 year old female with a history of  T2dm, psoriasis on  ixekizumab admitted on 6/8/2024 with fevers, progressive body aches, and a headache.     She went to [facility] on 6/7 and then later that evening noticed the onset of generalized malaise and body aches. She had a mild sore throat as well and that's already resolved. On the day of presentation had tactile fevers.  She noticed no appetite, so mild nausea without vomiting, and no vomiting. Headache started on the night of presentation and is just the top of her head, mostly throbbing in nature, and noted to occur after arriving in the ER and has responded to apap     Denies nc/ea/st, no cough/sob, no dysuria/urgency/frequency, no joint pains, no neck stiffness/neck pain. In " the ED temp 102.7 with associated tachycardia into the 130's, BPs and respiratory status ok. Mild leukocytosis, lactic acid 2.4. Procal 0.1, Covid/flu/rsv negative, Rapid strep negative, UA trace ketones, glucose, no inflammatory findings, Ucx negative, Blood cultures negative, CXR negative, Non contrast head CT negative for acute findings. She was given 3 L of IVF, empiric ceftriaxone. Admitted to observation for fever, and sepsis of unknown source.     Antibiotics were discontinued upon admission as there was no clear infectious source and unknown what we would be treating.  The concern is the patient is immunocompromise in the setting of being on ixekizumab which she takes for her psoriasis.  Admitting provider and rounding provider yesterday both recommended a lumbar puncture given her symptoms however patient refused.    Assumed patient care today.  Patient reports her headache and malaise has resolved.  Suspect this was likely a viral process. Afebrile for > 24 hours. At time of discharge cultures showed no growth to date.  Patient was feeling much better and wanted to discharge home.  She denied any fevers, chills, chest pain, shortness of breath, abdominal pain, nausea/vomiting, urinary symptoms, headache, neck stiffness.  No signs of meningitis on physical exam.  Educated patient on return precautions and answered all questions prior to discharge.    Consultations This Hospital Stay   None    Code Status   Prior    Time Spent on this Encounter   I, NIRAJ Rodriguez PA-C, personally saw the patient today and spent less than or equal to 30 minutes discharging this patient.       NIRAJ Rodriguez PA-C  [facility] OBSERVATION DEPT  [address] [place] [address]  Phone: [phone]  ______________________________________________________________________    Physical Exam   Vital Signs:                    Weight: 188 lbs 14.95 oz    GENERAL:  Alert, Comfortable, No acute distress. Sitting  up in bed.  PSYCH: pleasant, oriented.  HEENT:  Normocephalic, No scleral icterus or conjunctival injection, normal hearing  NECK:  Supple, no neck rigidity   HEART:  Normal S1, S2 with no murmur, RRR  LUNGS:  Normal Respiratory effort. Clear to auscultation bilaterally with no wheezing, rales or ronchi.  SKIN:  Warm, dry to touch. No rash.  NEUROLOGIC:  Speech clear, alert & orientated x 4, no focal deficits. Moving all extremities.     Primary Care Physician   Physician No Ref-Primary    Discharge Orders      Reason for your hospital stay    You were admitted for fever, malaise, and elevated WBC. No obvious cause for your symptoms could have been a viral illness. Blood cultures were negative so far. Someone will contact you if there is any changes on the cultures. If you were to redevelop fever, headache, body aches would come back for revaluation and reconsideration of the LP. Please follow up with PCP.     Follow-up and recommended labs and tests     Follow up with primary care provider, Physician No Ref-Primary, within 7-14 days for hospital follow- up.  No follow up labs or test are needed.     Activity    Your activity upon discharge: activity as tolerated     Monitor and record    blood pressure daily and bring record to next appointment with PCP     When to contact your care team    Go to ED or urgent care if you have any of the following: fever > 100.4, body aches, neck pain, increasing headache.     Diet    Follow this diet upon discharge: Regular Diet Adult       Significant Results and Procedures   Results for orders placed or performed during the hospital encounter of 06/08/24   XR Chest 2 Views    Narrative    EXAM: XR CHEST 2 VIEWS  LOCATION: [facility]  DATE: 6/8/2024    INDICATION: fever, sepsis  COMPARISON: Chest film 3/29/2017      Impression    IMPRESSION: Negative chest.   CT Head w/o Contrast    Narrative    EXAM: CT HEAD W/O CONTRAST  LOCATION: [facility]OSP[facility]  DATE: 6/8/2024    INDICATION: headache  COMPARISON: None.  TECHNIQUE: Routine CT Head without IV contrast. Multiplanar reformats. Dose reduction techniques were used.    FINDINGS:  INTRACRANIAL CONTENTS: No intracranial hemorrhage, extraaxial collection, or mass effect.  No CT evidence of acute infarct. Normal parenchymal attenuation. Normal ventricles and sulci.     VISUALIZED ORBITS/SINUSES/MASTOIDS: No intraorbital abnormality. No paranasal sinus mucosal disease. No middle ear or mastoid effusion.    BONES/SOFT TISSUES: No acute abnormality.      Impression    IMPRESSION:  1.  Normal head CT.       Discharge Medications   Discharge Medication List as of 6/10/2024 10:19 AM        CONTINUE these medications which have NOT CHANGED    Details   acetaminophen (TYLENOL) 500 MG tablet Take 1-2 tablets (500-1,000 mg) by mouth every 6 hours as needed for mild pain, Disp-90 tablet, R-0, E-Prescribe      aluminum chloride (DRYSOL) 20 % external solution 2-3 times weekly as neededHistorical      glipiZIDE (GLUCOTROL XL) 2.5 MG 24 hr tablet Take 2.5 mg by mouth daily, Historical      ibuprofen (ADVIL/MOTRIN) 200 MG tablet Take 1 tablet (200 mg) by mouth every 4 hours as needed for pain, Disp-90 tablet, R-0, E-Prescribe      ixekizumab (TALTZ) 80 MG/ML SOAJ auto-injector Inject 80 mg Subcutaneous every 28 days, Historical      semaglutide (OZEMPIC) 2 MG/1.5ML SOPN pen Inject 0.25 mg Subcutaneous every 7 days, Historical      triamcinolone (KENALOG) 0.1 % external cream Apply topically 2 times dailyHistorical           Allergies   Allergies   Allergen Reactions    Metformin     Otezla [Apremilast]

## 2024-06-10 NOTE — PLAN OF CARE
"Patient's After Visit Summary was reviewed with patient.   Patient verbalized understanding of After Visit Summary, recommended follow up and was given an opportunity to ask questions.   Discharge medications sent home with patient/family: No   Discharged with other:self        Problem: Adult Inpatient Plan of Care  Goal: Plan of Care Review  Description: The Plan of Care Review/Shift note should be completed every shift.  The Outcome Evaluation is a brief statement about your assessment that the patient is improving, declining, or no change.  This information will be displayed automatically on your shift  note.  6/10/2024 1031 by Sonia Kwon RN  Outcome: Met  Flowsheets (Taken 6/10/2024 1031)  Outcome Evaluation: Discharging home  Plan of Care Reviewed With: patient  Overall Patient Progress: improving  6/10/2024 0913 by Sonia Kwon RN  Outcome: Progressing  Flowsheets (Taken 6/10/2024 0913)  Outcome Evaluation: No fever with vitals, reporting no headache.  Plan of Care Reviewed With: patient  Overall Patient Progress: improving  Goal: Patient-Specific Goal (Individualized)  Description: You can add care plan individualizations to a care plan. Examples of Individualization might be:  \"Parent requests to be called daily at 9am for status\", \"I have a hard time hearing out of my right ear\", or \"Do not touch me to wake me up as it startles  me\".  6/10/2024 1031 by Sonia Kwon RN  Outcome: Met  6/10/2024 0913 by Sonia Kwon RN  Outcome: Progressing  Goal: Absence of Hospital-Acquired Illness or Injury  6/10/2024 1031 by Sonia Kwon RN  Outcome: Met  6/10/2024 0913 by Sonia Kwon RN  Outcome: Progressing  Intervention: Identify and Manage Fall Risk  Recent Flowsheet Documentation  Taken 6/10/2024 1023 by Sonia Kwon, RN  Safety Promotion/Fall Prevention: nonskid shoes/slippers when out of bed  Goal: Optimal Comfort and Wellbeing  6/10/2024 1031 by Sonia Kwon, CLOVIS  Outcome: " Met  6/10/2024 0913 by Sonia Kwon RN  Outcome: Progressing  Goal: Readiness for Transition of Care  6/10/2024 1031 by Sonia Kwon RN  Outcome: Met  6/10/2024 0913 by Sonia Kwon RN  Outcome: Progressing     Problem: Fever  Goal: Body Temperature in Desired Range  6/10/2024 1031 by Sonia Kwon RN  Outcome: Met  6/10/2024 0913 by Sonia Kwon RN  Outcome: Progressing       Goal Outcome Evaluation:      Plan of Care Reviewed With: patient    Overall Patient Progress: improvingOverall Patient Progress: improving    Outcome Evaluation: Discharging home

## 2024-06-10 NOTE — PLAN OF CARE
"Dx: Influenza-like illness, tachycardia    BP (!) 141/95 (BP Location: Right arm)   Pulse 86   Temp 98.2  F (36.8  C) (Oral)   Resp 18   Ht 1.727 m (5' 8\")   Wt 85.7 kg (188 lb 15 oz)   LMP 06/04/2024 (Approximate)   SpO2 96%   BMI 28.73 kg/m       A&O x4. Independent in room. Regular diet tolerated. . Reporting no pain/headache, vitals show no fever. IV saline locked. Bed alarm off and call light within reach.       Problem: Adult Inpatient Plan of Care  Goal: Plan of Care Review  Description: The Plan of Care Review/Shift note should be completed every shift.  The Outcome Evaluation is a brief statement about your assessment that the patient is improving, declining, or no change.  This information will be displayed automatically on your shift  note.  Outcome: Progressing  Flowsheets (Taken 6/10/2024 0913)  Outcome Evaluation: No fever with vitals, reporting no headache.  Plan of Care Reviewed With: patient  Overall Patient Progress: improving  Goal: Patient-Specific Goal (Individualized)  Description: You can add care plan individualizations to a care plan. Examples of Individualization might be:  \"Parent requests to be called daily at 9am for status\", \"I have a hard time hearing out of my right ear\", or \"Do not touch me to wake me up as it startles  me\".  Outcome: Progressing  Goal: Absence of Hospital-Acquired Illness or Injury  Outcome: Progressing  Goal: Optimal Comfort and Wellbeing  Outcome: Progressing  Goal: Readiness for Transition of Care  Outcome: Progressing     Problem: Fever  Goal: Body Temperature in Desired Range  Outcome: Progressing       Goal Outcome Evaluation:      Plan of Care Reviewed With: patient    Overall Patient Progress: improvingOverall Patient Progress: improving    Outcome Evaluation: No fever with vitals, reporting no headache.      "

## 2024-06-10 NOTE — PLAN OF CARE
PRIMARY DIAGNOSIS: fever/malaise  OUTPATIENT/OBSERVATION GOALS TO BE MET BEFORE DISCHARGE:  ADLs back to baseline: Yes    Activity and level of assistance: Ambulating independently.    Pain status: Improved-controlled with oral pain medications.    Return to near baseline physical activity: Yes     Discharge Planner Nurse   Safe discharge environment identified: Yes  Barriers to discharge: Yes       Entered by: Luh Shetty RN 06/10/2024 2:41 AM  Pt AO x4. VSS on RA, LS clear, BS active. Pt up independently. PIV SL. Tolerating regular diet. Pt noted headache 4/10 pain, also reported neck pain, acetaminophen given x2 which was effective. Will continue to monitor and provide supportive cares.   Please review provider order for any additional goals.   Nurse to notify provider when observation goals have been met and patient is ready for discharge.    Goal Outcome Evaluation:      Plan of Care Reviewed With: patient    Overall Patient Progress: improvingOverall Patient Progress: improving    Outcome Evaluation: Pt noted feeling better mild headache noted, feeling less tired.

## 2024-06-10 NOTE — PLAN OF CARE
PRIMARY DIAGNOSIS: fever/malaise  OUTPATIENT/OBSERVATION GOALS TO BE MET BEFORE DISCHARGE:  ADLs back to baseline: Yes    Activity and level of assistance: Ambulating independently.    Pain status: Improved-controlled with oral pain medications.    Return to near baseline physical activity: Yes     Discharge Planner Nurse   Safe discharge environment identified: Yes  Barriers to discharge: Yes       Entered by: Luh Shetty RN 06/09/2024 10:29 PM  Pt AO x4. VSS on RA, LS clear, BS active. Pt up independently. PIV SL. Tolerating regular diet. Pt noted H/A 4/10 pain, acetaminophen given. Will continue to monitor and provide supportive cares.   Please review provider order for any additional goals.   Nurse to notify provider when observation goals have been met and patient is ready for discharge.    Goal Outcome Evaluation:      Plan of Care Reviewed With: patient    Overall Patient Progress: improvingOverall Patient Progress: improving    Outcome Evaluation: Pt noted feeling better mild headache noted, feeling less tired.

## 2024-06-10 NOTE — PLAN OF CARE
PRIMARY DIAGNOSIS: fever/malaise  OUTPATIENT/OBSERVATION GOALS TO BE MET BEFORE DISCHARGE:  ADLs back to baseline: Yes    Activity and level of assistance: Ambulating independently.    Pain status: Improved-controlled with oral pain medications.    Return to near baseline physical activity: Yes     Discharge Planner Nurse   Safe discharge environment identified: Yes  Barriers to discharge: Yes       Entered by: Luh Shetty RN 06/10/2024 6:06 AM  Pt AO x4. VSS on RA, LS clear, BS active. Pt up independently. PIV SL. Tolerating regular diet. Pt noted headache and neck 4/10 pain, acetaminophen given x2 which was effective. Will continue to monitor and provide supportive cares.   Please review provider order for any additional goals.   Nurse to notify provider when observation goals have been met and patient is ready for discharge.    Goal Outcome Evaluation:      Plan of Care Reviewed With: patient    Overall Patient Progress: improvingOverall Patient Progress: improving    Outcome Evaluation: Pt noted feeling better mild headache noted, feeling less tired.

## 2024-06-12 ENCOUNTER — PATIENT OUTREACH (OUTPATIENT)
Dept: CARE COORDINATION | Facility: CLINIC | Age: 39
End: 2024-06-12
Payer: COMMERCIAL

## 2024-06-12 NOTE — PROGRESS NOTES
[facility]:   [facility] Contact  [facility]/Voicemail     Clinical Data: Post-Discharge Outreach     Outreach attempted x 2.  Left message on patient's voicemail, providing [facility]'s central phone number of [phone] ([phone]) for questions/concerns and/or to schedule an appt with an [facility] provider, if they do not have a PCP.      Plan:  [facility] will do no further outreaches at this time.       Elvira Foss  Community Health Worker  [facility], [facility]  [phone]      *Connected Care Resource Team does NOT follow patient ongoing. Referrals are identified based on internal discharge reports and the outreach is to ensure patient has an understanding of their discharge instructions.

## 2024-06-14 LAB
BACTERIA BLD CULT: NO GROWTH
BACTERIA BLD CULT: NO GROWTH

## 2024-06-23 ENCOUNTER — HEALTH MAINTENANCE LETTER (OUTPATIENT)
Age: 39
End: 2024-06-23

## 2024-07-26 ENCOUNTER — OFFICE VISIT (OUTPATIENT)
Dept: URGENT CARE | Facility: URGENT CARE | Age: 39
End: 2024-07-26
Payer: COMMERCIAL

## 2024-07-26 VITALS
HEART RATE: 75 BPM | WEIGHT: 180.2 LBS | DIASTOLIC BLOOD PRESSURE: 79 MMHG | TEMPERATURE: 98.7 F | BODY MASS INDEX: 27.4 KG/M2 | SYSTOLIC BLOOD PRESSURE: 118 MMHG | OXYGEN SATURATION: 98 % | RESPIRATION RATE: 18 BRPM

## 2024-07-26 DIAGNOSIS — Z97.3 WEARS CONTACT LENSES: ICD-10-CM

## 2024-07-26 DIAGNOSIS — H10.9 BACTERIAL CONJUNCTIVITIS OF RIGHT EYE: Primary | ICD-10-CM

## 2024-07-26 DIAGNOSIS — E11.9 TYPE 2 DIABETES MELLITUS WITHOUT COMPLICATION, WITHOUT LONG-TERM CURRENT USE OF INSULIN (H): ICD-10-CM

## 2024-07-26 PROCEDURE — 99213 OFFICE O/P EST LOW 20 MIN: CPT | Performed by: PHYSICIAN ASSISTANT

## 2024-07-26 RX ORDER — OFLOXACIN 3 MG/ML
1-2 SOLUTION/ DROPS OPHTHALMIC 4 TIMES DAILY
Qty: 10 ML | Refills: 0 | Status: SHIPPED | OUTPATIENT
Start: 2024-07-26 | End: 2024-08-02

## 2024-07-26 NOTE — PROGRESS NOTES
Assessment & Plan     Bacterial conjunctivitis of right eye    You are being treated for bacterial conjunctivitis.  The most common symptoms of conjunctivitis include a thick, pus-like discharge from the eye, swollen eyelids, redness, eyelids sticking together upon awakening, and a gritty or scratchy feeling in the eye. You have been given an antibiotic eye drop to treat this infection.  With treatment, the infection takes about 7 days to clear up.   Home care  Use prescribed antibiotic eye drops or ointment as directed to treat the infection.  Apply a warm compress (towel soaked in warm water) to the affected eye 3 to 4 times a day. Do this just before applying medicine to the eye.  Use a warm, wet cloth to wipe away crusting of the eyelids in the morning. This is caused by mucus drainage during the night.     - ofloxacin (OCUFLOX) 0.3 % ophthalmic solution  Dispense: 10 mL; Refill: 0    Type 2 diabetes mellitus without complication, without long-term current use of insulin (H)    Patient is diabetic, monitor symptoms closely as increased blood sugars can lead to worsening infections    Wears contact lenses    Remove contact from eyes while having infection  There is an increased risk of corneal ulcers       No follow-ups on file.    Joce Stapleton, [facility], PA-C  [facility] ADRIANNA Hurtado is a 38 year old female who presents to clinic today for the following health issues:  Chief Complaint   Patient presents with    Conjunctivitis     Right eye conjunctivitis for the last few days        HPI  Review of Systems  Constitutional, HEENT, cardiovascular, pulmonary, gi and gu systems are negative, except as otherwise noted.      Objective    /79 (BP Location: Left arm, Patient Position: Sitting, Cuff Size: Adult Regular)   Pulse 75   Temp 98.7  F (37.1  C) (Oral)   Resp 18   Wt 81.7 kg (180 lb 3.2 oz)   LMP 06/04/2024 (Approximate)   SpO2 98%   BMI 27.40 kg/m    Physical Exam    GENERAL: alert and no distress  EYES: PERRL, EOMI, and conjunctiva/corneas- conjunctival injection OD and yellow colored discharge present right  HENT: ear canals and TM's normal, nose and mouth without ulcers or lesions  NECK: no adenopathy, no asymmetry, masses, or scars  RESP: lungs clear to auscultation - no rales, rhonchi or wheezes  CV: regular rate and rhythm, normal S1 S2, no S3 or S4, no murmur, click or rub, no peripheral edema  MS: no gross musculoskeletal defects noted, no edema  SKIN: no suspicious lesions or rashes  NEURO: Normal strength and tone, mentation intact and speech normal  PSYCH: mentation appears normal, affect normal/bright      No results found for any visits on 07/26/24.

## 2025-01-04 ENCOUNTER — HEALTH MAINTENANCE LETTER (OUTPATIENT)
Age: 40
End: 2025-01-04

## 2025-02-21 ENCOUNTER — HOSPITAL ENCOUNTER (EMERGENCY)
Facility: CLINIC | Age: 40
Discharge: HOME | End: 2025-02-21
Attending: EMERGENCY MEDICINE | Admitting: EMERGENCY MEDICINE
Payer: COMMERCIAL

## 2025-02-21 VITALS
BODY MASS INDEX: 29.16 KG/M2 | HEART RATE: 66 BPM | HEIGHT: 66 IN | DIASTOLIC BLOOD PRESSURE: 105 MMHG | WEIGHT: 181.44 LBS | SYSTOLIC BLOOD PRESSURE: 150 MMHG | TEMPERATURE: 97.1 F | OXYGEN SATURATION: 98 % | RESPIRATION RATE: 18 BRPM

## 2025-02-21 DIAGNOSIS — E11.649 HYPOGLYCEMIC EPISODE IN PATIENT WITH DIABETES MELLITUS (H): ICD-10-CM

## 2025-02-21 LAB
GLUCOSE BLDC GLUCOMTR-MCNC: 183 MG/DL (ref 70–99)
GLUCOSE BLDC GLUCOMTR-MCNC: 272 MG/DL (ref 70–99)

## 2025-02-21 PROCEDURE — 82962 GLUCOSE BLOOD TEST: CPT

## 2025-02-21 PROCEDURE — 99284 EMERGENCY DEPT VISIT MOD MDM: CPT

## 2025-02-21 ASSESSMENT — COLUMBIA-SUICIDE SEVERITY RATING SCALE - C-SSRS
2. HAVE YOU ACTUALLY HAD ANY THOUGHTS OF KILLING YOURSELF IN THE PAST MONTH?: NO
1. IN THE PAST MONTH, HAVE YOU WISHED YOU WERE DEAD OR WISHED YOU COULD GO TO SLEEP AND NOT WAKE UP?: NO
6. HAVE YOU EVER DONE ANYTHING, STARTED TO DO ANYTHING, OR PREPARED TO DO ANYTHING TO END YOUR LIFE?: NO

## 2025-02-21 ASSESSMENT — ACTIVITIES OF DAILY LIVING (ADL): ADLS_ACUITY_SCORE: 42

## 2025-02-21 NOTE — ED TRIAGE NOTES
Pt arrives for potential low blood sugar, T2 DM. Normally uses dexcom but hasn't for the past 3 days. Ate this morning, took glipizide and hasn't eaten since. Started feeling like her BG was low around 1530 (shakiness, sweating), drank 2 large cups of coke PTA.  in triage. She reports she currently feels cold and some warmth in the legs. AVSS on RA.

## 2025-02-21 NOTE — ED PROVIDER NOTES
"  Emergency Department Note      History of Present Illness     Chief Complaint   Hypoglycemia      HPI   Genesis Brennan is a 39 year old female with a history of diabetes mellitus type II, hypertension, and hyperlipidemia who presents to the ED for evaluation of hypoglycemia. Patient reports that around 1500 she was driving her daughter home from school when she began shaking, feeling nervous, and felt hot. She believed this may be hypoglycemia because she had experienced these feelings previously when pregnant, so she drove to a drive through and drank a coke. She then pulled over, had her  pick their daughter up, and drove to the ED. Patient has had a dexcom for the last year, but took it off at the beginning of this week and has not checked her BG since. She has felt fine until today. Patient ate breakfast but did not eat lunch. She took her normal dose of medications today. She denies flu symptoms, sore throat, cough, chance of being pregnant, or abnormal bladder or bowel function.    Independent Historian   None    Review of External Notes   None    Past Medical History     Medical History and Problem List   Hyperlipidemia   Hypertension  Mantoux positive  Psoriasis  Diabetes mellitus type II     Medications   Glucotrol xl  Taltz  Ozempic  Lipitor  Metformin  Aspirin 81mg    Surgical History   Lumpectomy breast    Physical Exam     Patient Vitals for the past 24 hrs:   BP Temp Temp src Pulse Resp SpO2 Height Weight   02/21/25 1625 (!) 150/105 97.1  F (36.2  C) Temporal 66 18 98 % 1.676 m (5' 6\") 82.3 kg (181 lb 7 oz)     Physical Exam      CV: ppi, regular   Resp: speaking in full sentences without any resp distress  Skin: warm dry well perfused  Neuro: Alert, moving all extremities without apparent deficit,  gait stable            Diagnostics     Lab Results   Labs Ordered and Resulted from Time of ED Arrival to Time of ED Departure   GLUCOSE BY METER - Abnormal       Result Value    GLUCOSE BY METER " POCT 183 (*)    GLUCOSE BY METER - Abnormal    GLUCOSE BY METER POCT 272 (*)        Imaging   No orders to display     Independent Interpretation   None    ED Course      Medications Administered   Medications - No data to display    Procedures   Procedures     Discussion of Management   None    ED Course   ED Course as of 02/21/25 1740   Fri Feb 21, 2025   1704 I obtained the history and performed the examination as described.        Additional Documentation  None    Medical Decision Making / Diagnosis     CMS Diagnoses: None    MIPS       None    MDM   Genesis Brennan is a 39 year old female     History of diabetes on glipizide here with concern for hypoglycemic episode.  Prior to arrival had symptoms reminiscent of previous hypoglycemic episodes and so stopped at a fast food restaurant and had Coca-Cola.  Then came here just to be sure sugar would go up.  Currently no significant ongoing symptoms.  Blood sugar in triage was 183.  Denies any chance of an accidental or intentional overdose of the glipizide.  Denies any review of systems suggesting an underlying infectious process.  She would like to have the blood sugar checked 1 more time which is certainly reasonable.  I did offer to consider other testing such as EKG, basic blood test, flu swab to ensure there is no other underlying medical process that would provoke this.  She would like to declined that for now and be discharged home with a repeat blood sugar is not low.  This is reasonable and she has the capacity to make her own decisions.  She states she does have a Dexcom at home which she can place on.  I did give her a paper prescription for glucometer lancets and test strips should she have issues with the Dexcom.  Discharged home.    Disposition   The patient was discharged.     Diagnosis     ICD-10-CM    1. Hypoglycemic episode in patient with diabetes mellitus (H)  E11.649            Discharge Medications   Discharge Medication List as of 2/21/2025   5:18 PM        START taking these medications    Details   blood glucose (NO BRAND SPECIFIED) lancets standard Use to test blood sugar 4 times daily or as directed.Disp-100 Lancet, R-0Local Print      blood glucose (NO BRAND SPECIFIED) test strip Use to test blood sugar 4 times daily or as directed., Disp-100 strip, R-0, Local Print      blood glucose monitoring (NO BRAND SPECIFIED) meter device kit Use to test blood sugar 4 times daily or as directed.Disp-1 kit, R-0Local Print               Scribe Disclosure:  I, Gaudencio Zhang, am serving as a scribe at 5:19 PM on 2/21/2025 to document services personally performed by Jake Alves MD based on my observations and the provider's statements to me.        Jake Alves MD  02/21/25 6035

## 2025-07-12 ENCOUNTER — HEALTH MAINTENANCE LETTER (OUTPATIENT)
Age: 40
End: 2025-07-12